# Patient Record
Sex: MALE | Race: WHITE | NOT HISPANIC OR LATINO | Employment: OTHER | URBAN - NONMETROPOLITAN AREA
[De-identification: names, ages, dates, MRNs, and addresses within clinical notes are randomized per-mention and may not be internally consistent; named-entity substitution may affect disease eponyms.]

---

## 2017-09-12 ENCOUNTER — OFFICE VISIT (OUTPATIENT)
Dept: GASTROENTEROLOGY | Facility: CLINIC | Age: 72
End: 2017-09-12

## 2017-09-12 VITALS
OXYGEN SATURATION: 98 % | BODY MASS INDEX: 27.44 KG/M2 | TEMPERATURE: 97.4 F | SYSTOLIC BLOOD PRESSURE: 134 MMHG | HEIGHT: 71 IN | HEART RATE: 67 BPM | DIASTOLIC BLOOD PRESSURE: 84 MMHG | WEIGHT: 196 LBS

## 2017-09-12 DIAGNOSIS — Z86.010 HX OF COLONIC POLYP: Primary | ICD-10-CM

## 2017-09-12 DIAGNOSIS — K22.70 BARRETT'S ESOPHAGUS WITHOUT DYSPLASIA: ICD-10-CM

## 2017-09-12 PROCEDURE — 99213 OFFICE O/P EST LOW 20 MIN: CPT | Performed by: NURSE PRACTITIONER

## 2017-09-12 RX ORDER — GEMFIBROZIL 600 MG/1
TABLET, FILM COATED ORAL
COMMUNITY
Start: 2011-01-04

## 2017-09-12 RX ORDER — LANOLIN ALCOHOL/MO/W.PET/CERES
CREAM (GRAM) TOPICAL
COMMUNITY
End: 2017-09-12 | Stop reason: ALTCHOICE

## 2017-09-12 RX ORDER — ESOMEPRAZOLE MAGNESIUM 40 MG/1
CAPSULE, DELAYED RELEASE ORAL
COMMUNITY
End: 2017-09-12 | Stop reason: SINTOL

## 2017-09-12 RX ORDER — LEVOTHYROXINE AND LIOTHYRONINE 19; 4.5 UG/1; UG/1
TABLET ORAL
COMMUNITY
End: 2017-09-12 | Stop reason: ALTCHOICE

## 2017-09-12 RX ORDER — OMEPRAZOLE 20 MG/1
20 CAPSULE, DELAYED RELEASE ORAL DAILY
COMMUNITY

## 2017-09-12 RX ORDER — TAMSULOSIN HYDROCHLORIDE 0.4 MG/1
1 CAPSULE ORAL DAILY
COMMUNITY

## 2017-09-12 NOTE — PROGRESS NOTES
[facility] Gastroenterology    Primary Physician Ace Ocasio MD    9/12/2017    Hector Silva   1945      Chief Complaint   Patient presents with   • Colonoscopy   Edwards's esophagus    Subjective     HPI    Hector Silva is a 72 y.o. male who presents as a referral for preventative maintenance. He has no complaints of nausea or vomiting. No change in bowels. No wt loss. No BRBPR. No melena. No abdominal pain.   No family hx for colon cancer or colon polyps.  He has personal h/o colon polyps. No personal h/o colon cancer.  Last colonoscopy  Was 5/2012, rec recall 5 year. Has lost 13 # over the last one month, eating more vegetables, not as much pork or meat.     Edwards's esophagus noted on egd , 5/2014. Taking omeprazole otc daily with control of gerd.  Denies dysphagia or odynophagia.  No reflux symptoms.     Past Medical History:   Diagnosis Date   • Colon polyp    • GERD (gastroesophageal reflux disease)    • Mitral insufficiency    • Tricuspid insufficiency        Past Surgical History:   Procedure Laterality Date   • COLONOSCOPY  05/04/2012   • ENDOSCOPY  05/29/2014    Edwards's esophagus       Outpatient Prescriptions Marked as Taking for the 9/12/17 encounter (Office Visit) with BHAVESH Fischer   Medication Sig Dispense Refill   • Cholecalciferol (VITAMIN D3 PO) Take  by mouth Daily.     • Cyanocobalamin 1000 MCG/ML kit Inject  as directed 1 (One) Time Per Week.     • gemfibrozil (LOPID) 600 MG tablet Take  by mouth.     • Glucosamine Sulfate 750 MG tablet Take  by mouth.     • omeprazole (priLOSEC) 20 MG capsule Take 20 mg by mouth Daily.     • tamsulosin (FLOMAX) 0.4 MG capsule 24 hr capsule Take 1 capsule by mouth Daily.     • Thyroid (THYROIDTHROID) 48.75 MG PO tablet tablet Take 48.75 mg by mouth 2 (Two) Times a Day.         Allergies   Allergen Reactions   • Amoxicillin    • Biaxin [Clarithromycin]    • Codeine    • Keflex [Cephalexin]    • Niaspan [Niacin Er]    •  Penicillins    • Sulfa Antibiotics    • Vancomycin        Social History     Social History   • Marital status:      Spouse name: N/A   • Number of children: N/A   • Years of education: N/A     Occupational History   • Not on file.     Social History Main Topics   • Smoking status: Never Smoker   • Smokeless tobacco: Never Used   • Alcohol use Yes      Comment: rare   • Drug use: No   • Sexual activity: Defer     Other Topics Concern   • Not on file     Social History Narrative       Family History   Problem Relation Age of Onset   • Cancer Mother      lung   • Cancer Brother      Melanoma   • Colon cancer Neg Hx    • Colon polyps Neg Hx        Review of Systems   Constitutional: Negative for appetite change, chills, fatigue, fever and unexpected weight change.   HENT: Negative for sore throat and trouble swallowing.    Respiratory: Negative for cough, chest tightness, shortness of breath and wheezing.    Cardiovascular: Negative for chest pain and palpitations.   Gastrointestinal: Negative for abdominal distention, abdominal pain, anal bleeding, blood in stool, constipation, diarrhea, nausea, rectal pain and vomiting.        As mentioned in hpi   Genitourinary: Negative for difficulty urinating, dysuria and hematuria.   Musculoskeletal: Negative for arthralgias and back pain.   Skin: Negative for color change and rash.   Neurological: Negative for dizziness, syncope, light-headedness and headaches.   Psychiatric/Behavioral: Negative for confusion. The patient is not nervous/anxious.        Objective     Vitals:    09/12/17 1026   BP: 134/84   Pulse: 67   Temp: 97.4 °F (36.3 °C)   SpO2: 98%     Last 2 weights    09/12/17  1026   Weight: 196 lb (88.9 kg)     Body mass index is 27.73 kg/(m^2).    Physical Exam   Constitutional: He appears well-developed and well-nourished. No distress.   HENT:   Head: Normocephalic and atraumatic.   Eyes: EOM are normal. No scleral icterus.   Neck: Neck supple. No JVD present.    Cardiovascular: Normal rate, regular rhythm and normal heart sounds.    Pulmonary/Chest: Effort normal and breath sounds normal. No stridor.   Abdominal: Soft. Bowel sounds are normal. He exhibits no distension and no mass. There is no tenderness. There is no rebound and no guarding.   Musculoskeletal: He exhibits no edema.   Neurological: He is alert.   Skin: Skin is warm and dry. No rash noted.   Psychiatric: He has a normal mood and affect. His behavior is normal.   Vitals reviewed.      Imaging Results (most recent)     None          Assessment/Plan     Hector was seen today for colonoscopy.    Diagnoses and all orders for this visit:    Hx of colonic polyp  -     Case Request; Standing  -     Case Request    Edwards's esophagus without dysplasia  -     Case Request; Standing  -     Case Request    Other orders  -     Implement Anesthesia Orders Day of Procedure; Standing  -     Obtain Informed Consent; Standing  -     polyethylene glycol (GOLYTELY) 236 g solution; Take 4,000 mL by mouth 1 (One) Time for 1 dose. Take as directed per instruction sheet.    plan for colonoscopy.  Plan for egd as well. Edwards's esophagus was noted on upper endoscopy 2014.  We did discuss Edwards's esophagus and it slim chance of developing cancer.  He verbalizes understanding.  He verbalizes understanding of the importance of taking the PPI on a daily basis.  rec continue omeprazole daily. Office visit as needed.    ESOPHAGOGASTRODUODENOSCOPY WITH ANESTHESIA (N/A), COLONOSCOPY WITH ANESTHESIA (N/A)  All risks, benefits, alternatives, and indications of colonoscopy procedure have been discussed with the patient. Risks to include perforation of the colon requiring possible surgery or colostomy, risk of bleeding from biopsies or removal of colon tissue, possibility of missing a colon polyp or cancer, or adverse drug reaction.  Benefits to include the diagnosis and management of disease of the colon and rectum. Alternatives to include  barium enema, radiographic evaluation, lab testing or no intervention. Pt verbalizes understanding and agrees.     Risk, benefits, and alternatives of endoscopy were explained in full.  They understand that there is a risk of bleeding, perforation, and infection.  The risk of perforation goes up with esophageal dilation.  Other options to evaluate UGI complaints could involve barium swallow or UGI series, but these would be diagnostic tests only.  Patient was given time to ask questions.  I answered them to their satisfaction and they are agreeable to proceeding        BHAVESH Angel      EMR Dragon/transcription disclaimer:  Much of this encounter note is electronic transcription/translation of spoken language to printed text.  The electronic translation of spoken language may be erroneous, or at times, nonsensical words or phrases may be inadvertently transcribed.  Although I have reviewed the note for such errors, some may still exist.

## 2017-09-13 PROBLEM — K22.70 BARRETT'S ESOPHAGUS WITHOUT DYSPLASIA: Status: ACTIVE | Noted: 2017-09-13

## 2017-09-13 PROBLEM — Z86.010 HX OF COLONIC POLYP: Status: ACTIVE | Noted: 2017-09-13

## 2017-10-03 LAB
ESTRADIOL LEVEL: 6 PG/ML
MAGNESIUM: 1.8 MG/DL (ref 1.6–2.4)
PROSTATE SPECIFIC ANTIGEN: 2.97 NG/ML (ref 0–4)
T3 FREE: 2.9 PG/ML (ref 2–4.4)
T4 FREE: 0.8 NG/DL (ref 0.9–1.7)
TSH SERPL DL<=0.05 MIU/L-ACNC: 0.43 UIU/ML (ref 0.27–4.2)
VITAMIN D 25-HYDROXY: >60 NG/ML

## 2017-10-04 LAB
SEX HORMONE BINDING GLOBULIN: 69 NMOL/L (ref 11–80)
TESTOSTERONE FREE PERCENT: 1.1 % (ref 1.6–2.9)
TESTOSTERONE FREE, CALC: 12 PG/ML (ref 47–244)
TESTOSTERONE TOTAL-MALE: 109 NG/DL (ref 300–720)

## 2017-10-06 LAB — T3 REVERSE: 13.3 NG/DL (ref 9–27)

## 2017-10-25 ENCOUNTER — HOSPITAL ENCOUNTER (OUTPATIENT)
Facility: HOSPITAL | Age: 72
Setting detail: HOSPITAL OUTPATIENT SURGERY
Discharge: HOME | End: 2017-10-25
Attending: INTERNAL MEDICINE | Admitting: INTERNAL MEDICINE

## 2017-10-25 ENCOUNTER — ANESTHESIA EVENT (OUTPATIENT)
Dept: GASTROENTEROLOGY | Facility: HOSPITAL | Age: 72
End: 2017-10-25

## 2017-10-25 ENCOUNTER — ANESTHESIA (OUTPATIENT)
Dept: GASTROENTEROLOGY | Facility: HOSPITAL | Age: 72
End: 2017-10-25

## 2017-10-25 VITALS
TEMPERATURE: 97.5 F | DIASTOLIC BLOOD PRESSURE: 76 MMHG | WEIGHT: 196 LBS | RESPIRATION RATE: 16 BRPM | BODY MASS INDEX: 28.06 KG/M2 | HEART RATE: 77 BPM | OXYGEN SATURATION: 96 % | HEIGHT: 70 IN | SYSTOLIC BLOOD PRESSURE: 128 MMHG

## 2017-10-25 DIAGNOSIS — K22.70 BARRETT'S ESOPHAGUS WITHOUT DYSPLASIA: ICD-10-CM

## 2017-10-25 PROCEDURE — 43239 EGD BIOPSY SINGLE/MULTIPLE: CPT | Performed by: INTERNAL MEDICINE

## 2017-10-25 PROCEDURE — 88305 TISSUE EXAM BY PATHOLOGIST: CPT | Performed by: INTERNAL MEDICINE

## 2017-10-25 PROCEDURE — 25010000002 PROPOFOL 10 MG/ML EMULSION: Performed by: NURSE ANESTHETIST, CERTIFIED REGISTERED

## 2017-10-25 PROCEDURE — G0105 COLORECTAL SCRN; HI RISK IND: HCPCS | Performed by: INTERNAL MEDICINE

## 2017-10-25 RX ORDER — SODIUM CHLORIDE 9 MG/ML
500 INJECTION, SOLUTION INTRAVENOUS CONTINUOUS PRN
Status: DISCONTINUED | OUTPATIENT
Start: 2017-10-25 | End: 2017-10-25 | Stop reason: HOSPADM

## 2017-10-25 RX ORDER — LIDOCAINE HYDROCHLORIDE 20 MG/ML
INJECTION, SOLUTION INFILTRATION; PERINEURAL AS NEEDED
Status: DISCONTINUED | OUTPATIENT
Start: 2017-10-25 | End: 2017-10-25 | Stop reason: SURG

## 2017-10-25 RX ORDER — PROPOFOL 10 MG/ML
VIAL (ML) INTRAVENOUS AS NEEDED
Status: DISCONTINUED | OUTPATIENT
Start: 2017-10-25 | End: 2017-10-25 | Stop reason: SURG

## 2017-10-25 RX ORDER — ONDANSETRON 2 MG/ML
4 INJECTION INTRAMUSCULAR; INTRAVENOUS ONCE AS NEEDED
Status: DISCONTINUED | OUTPATIENT
Start: 2017-10-25 | End: 2017-10-25 | Stop reason: HOSPADM

## 2017-10-25 RX ORDER — SODIUM CHLORIDE 0.9 % (FLUSH) 0.9 %
3 SYRINGE (ML) INJECTION AS NEEDED
Status: DISCONTINUED | OUTPATIENT
Start: 2017-10-25 | End: 2017-10-25 | Stop reason: HOSPADM

## 2017-10-25 RX ADMIN — PROPOFOL 450 MG: 10 INJECTION, EMULSION INTRAVENOUS at 09:00

## 2017-10-25 RX ADMIN — LIDOCAINE HYDROCHLORIDE 50 MG: 20 INJECTION, SOLUTION INFILTRATION; PERINEURAL at 09:00

## 2017-10-25 RX ADMIN — SODIUM CHLORIDE 500 ML: 0.9 INJECTION, SOLUTION INTRAVENOUS at 07:32

## 2017-10-25 NOTE — ANESTHESIA POSTPROCEDURE EVALUATION
Patient: Hector Silva    Procedure Summary     Date Anesthesia Start Anesthesia Stop Room / Location    10/25/17 0854 0929  [facility] / [facility]       Procedure Diagnosis Surgeon Provider    ESOPHAGOGASTRODUODENOSCOPY WITH ANESTHESIA (N/A Esophagus); COLONOSCOPY WITH ANESTHESIA (N/A ) Edwards's esophagus without dysplasia; Hx of colonic polyp  (Edwards's esophagus without dysplasia [K22.70]; Hx of colonic polyp [Z86.010]) MD Charlie Cruz, MAURA          Anesthesia Type: general  Last vitals  BP   130/75 (10/25/17 0708)   Temp   97.5 °F (36.4 °C) (10/25/17 0708)   Pulse   80 (10/25/17 0708)   Resp   18 (10/25/17 0708)     SpO2   99 % (10/25/17 0708)     Post Anesthesia Care and Evaluation    Patient location during evaluation: PHASE II  Patient participation: complete - patient participated  Level of consciousness: awake  Pain management: adequate  Airway patency: patent  Anesthetic complications: No anesthetic complications  Respiratory status: acceptable  Hydration status: acceptable

## 2017-10-25 NOTE — PLAN OF CARE
Problem: GI Endoscopy (Adult)  Goal: Signs and Symptoms of Listed Potential Problems Will be Absent or Manageable (GI Endoscopy)  Outcome: Outcome(s) achieved Date Met:  10/25/17    10/25/17 0949   GI Endoscopy   Problems Assessed (GI Endoscopy) all   Problems Present (GI Endoscopy) none

## 2017-10-25 NOTE — H&P
"[facility] Gastroenterology  Pre Procedure History & Physical    Chief Complaint:   Edwards's    Subjective     HPI:   Edwards's  He feels well.  No complaints on GI review  Past Medical History:   Past Medical History:   Diagnosis Date   • Arthritis    • Colon polyp    • Disease of thyroid gland    • GERD (gastroesophageal reflux disease)    • Mitral insufficiency    • Tricuspid insufficiency        Past Surgical History:  [unfilled]    Family History:  Family History   Problem Relation Age of Onset   • Cancer Mother      lung   • Cancer Brother      Melanoma   • Colon cancer Neg Hx    • Colon polyps Neg Hx        Social History:   reports that he has never smoked. He has never used smokeless tobacco. He reports that he drinks alcohol. He reports that he does not use illicit drugs.    Medications:   Prior to Admission medications    Medication Sig Start Date End Date Taking? Authorizing Provider   Cholecalciferol (VITAMIN D3 PO) Take  by mouth Daily.   Yes Historical Provider, MD   gemfibrozil (LOPID) 600 MG tablet Take  by mouth. 1/4/11  Yes Historical Provider, MD   omeprazole (priLOSEC) 20 MG capsule Take 20 mg by mouth Daily.   Yes Historical Provider, MD   tamsulosin (FLOMAX) 0.4 MG capsule 24 hr capsule Take 1 capsule by mouth Daily.   Yes Historical Provider, MD   Thyroid (THYROIDTHROID) 48.75 MG PO tablet tablet Take 48.75 mg by mouth 2 (Two) Times a Day.   Yes Historical Provider, MD   Cyanocobalamin 1000 MCG/ML kit Inject  as directed 1 (One) Time Per Week.    Historical Provider, MD   Glucosamine Sulfate 750 MG tablet Take  by mouth.    Historical Provider, MD       Allergies:  Amoxicillin; Biaxin [clarithromycin]; Codeine; Keflex [cephalexin]; Niaspan [niacin er]; Penicillins; Sulfa antibiotics; and Vancomycin    Objective     Blood pressure 130/75, pulse 80, temperature 97.5 °F (36.4 °C), temperature source Temporal Artery , resp. rate 18, height 70\" (177.8 cm), weight 196 lb (88.9 kg), SpO2 99 " %.    Physical Exam   Constitutional: Pt is oriented to person, place, and in no distress.   HENT: Mouth/Throat: Oropharynx is clear.   Cardiovascular: Normal rate, regular rhythm.    Pulmonary/Chest: Effort normal. No respiratory distress. No  wheezes.   Abdominal: Soft. Non-distended.  Skin: Skin is warm and dry.   Psychiatric: Mood, memory, affect and judgment appear normal.     Assessment/Plan     Diagnosis:  Edwards's  History of colon polyps  Anticipated Surgical Procedure:  Endoscopy  Colonoscopy  The risks, benefits, and alternatives of this procedure have been discussed with the patient or the responsible party- the patient understands and agrees to proceed.      EMR Dragon/transcription disclaimer:  Much of this encounter note is electronic transcription/translation of spoken language to printed text.  The electronic translation of spoken language may be erroneous, or at times, nonsensical words or phrases may be inadvertently transcribed.  Although I have reviewed the note for such errors, some may still exist.

## 2017-10-25 NOTE — PLAN OF CARE
Problem: Patient Care Overview (Adult)  Goal: Plan of Care Review  Outcome: Outcome(s) achieved Date Met:  10/25/17    10/25/17 0948   Coping/Psychosocial Response Interventions   Plan Of Care Reviewed With patient;spouse   Patient Care Overview   Progress improving   Outcome Evaluation   Outcome Summary/Follow up Plan discharge criteria met

## 2017-10-25 NOTE — PLAN OF CARE
Problem: Patient Care Overview (Adult)  Goal: Plan of Care Review  Outcome: Ongoing (interventions implemented as appropriate)    10/25/17 0918   Coping/Psychosocial Response Interventions   Plan Of Care Reviewed With patient   Patient Care Overview   Progress progress toward functional goals as expected   Outcome Evaluation   Outcome Summary/Follow up Plan tolerated procedure well

## 2017-10-25 NOTE — ANESTHESIA PREPROCEDURE EVALUATION
Anesthesia Evaluation     Patient summary reviewed and Nursing notes reviewed   NPO Solid Status: > 6 hours  NPO Liquid Status: > 6 hours     Airway   Mallampati: II  TM distance: >3 FB  Neck ROM: full  no difficulty expected  Dental      Pulmonary - negative pulmonary ROS and normal exam   Cardiovascular   Exercise tolerance: good (4-7 METS)    Rhythm: regular  Rate: normal    (+) valvular problems/murmurs MVP and TI,       Neuro/Psych- negative ROS  GI/Hepatic/Renal/Endo    (+)  GERD well controlled,     Musculoskeletal (-) negative ROS    Abdominal  - normal exam   Substance History      OB/GYN          Other                                        Anesthesia Plan    ASA 2     general   total IV anesthesia  intravenous induction

## 2017-10-27 LAB
CYTO UR: NORMAL
LAB AP CASE REPORT: NORMAL
LAB AP CLINICAL INFORMATION: NORMAL
Lab: NORMAL
PATH REPORT.FINAL DX SPEC: NORMAL
PATH REPORT.GROSS SPEC: NORMAL

## 2018-06-13 ENCOUNTER — HOSPITAL ENCOUNTER (OUTPATIENT)
Dept: GENERAL RADIOLOGY | Facility: HOSPITAL | Age: 73
Discharge: HOME | End: 2018-06-13
Attending: FAMILY MEDICINE

## 2018-06-13 PROCEDURE — 71046 X-RAY EXAM CHEST 2 VIEWS: CPT

## 2020-11-19 ENCOUNTER — TELEPHONE (OUTPATIENT)
Dept: GASTROENTEROLOGY | Facility: CLINIC | Age: 75
End: 2020-11-19

## 2020-11-19 NOTE — TELEPHONE ENCOUNTER
SPOKE WITH PT'S SPOUSE ABOUT PT BEING DUE FOR A REPEAT ENDOSCOPY. SHE SAID SHE WOULD HAVE HIM CALL US BACK TO MAKE AN OV APPT.  LETTER SENT TO PCP.

## 2020-12-16 ENCOUNTER — OFFICE VISIT (OUTPATIENT)
Dept: GASTROENTEROLOGY | Facility: CLINIC | Age: 75
End: 2020-12-16

## 2020-12-16 VITALS
HEIGHT: 70 IN | HEART RATE: 82 BPM | SYSTOLIC BLOOD PRESSURE: 128 MMHG | BODY MASS INDEX: 28.2 KG/M2 | OXYGEN SATURATION: 99 % | DIASTOLIC BLOOD PRESSURE: 80 MMHG | WEIGHT: 197 LBS | TEMPERATURE: 97.3 F

## 2020-12-16 DIAGNOSIS — K22.70 BARRETT'S ESOPHAGUS WITHOUT DYSPLASIA: Primary | ICD-10-CM

## 2020-12-16 PROCEDURE — 99202 OFFICE O/P NEW SF 15 MIN: CPT | Performed by: NURSE PRACTITIONER

## 2020-12-16 RX ORDER — FINASTERIDE 5 MG/1
5 TABLET, FILM COATED ORAL DAILY
COMMUNITY

## 2020-12-16 NOTE — PROGRESS NOTES
[facility] GASTROENTEROLOGY - OFFICE NOTE    12/16/2020    Hector Silva Jr.   1945    Primary Physician: Ace Ocasio MD    Chief Complaint   Patient presents with   • Endoscopy   santana's esophagus.      HISTORY OF PRESENT ILLNESS:     Hector Silva Jr. is a 75 y.o. male presents with santana's esophagus diagnosed more than 5 years ago. He takes omeprazole daily otc. No dysphagia. No weight loss. No n/v.        Last egd 10/2017 esophageal biopsy without dysplasia.     Past Medical History:   Diagnosis Date   • Arthritis    • Colon polyp    • Disease of thyroid gland    • GERD (gastroesophageal reflux disease)    • Mitral insufficiency    • Tricuspid insufficiency        Past Surgical History:   Procedure Laterality Date   • BACK SURGERY      x 3   • COLONOSCOPY  05/04/2012   • COLONOSCOPY N/A 10/25/2017    Procedure: COLONOSCOPY WITH ANESTHESIA;  Surgeon: Rainer Recio MD;  Location: [facility] ENDOSCOPY;  Service:    • CYST REMOVAL     • ENDOSCOPY  05/29/2014    Santana's esophagus   • ENDOSCOPY N/A 10/25/2017    Procedure: ESOPHAGOGASTRODUODENOSCOPY WITH ANESTHESIA;  Surgeon: Rainer Recio MD;  Location: [facility] ENDOSCOPY;  Service:    • FOOT SURGERY     • WRIST SURGERY         Outpatient Medications Marked as Taking for the 12/16/20 encounter (Office Visit) with Gabriela Streeter APRN   Medication Sig Dispense Refill   • Cholecalciferol (VITAMIN D3 PO) Take  by mouth Daily.     • Cyanocobalamin 1000 MCG/ML kit Inject  as directed 1 (One) Time Per Week.     • finasteride (PROSCAR) 5 MG tablet Take 5 mg by mouth Daily.     • gemfibrozil (LOPID) 600 MG tablet Take  by mouth.     • Glucosamine Sulfate 750 MG tablet Take  by mouth.     • omeprazole (priLOSEC) 20 MG capsule Take 20 mg by mouth Daily.     • tamsulosin (FLOMAX) 0.4 MG capsule 24 hr capsule Take 1 capsule by mouth Daily.     • Thyroid (THYROIDTHROID) 48.75 MG PO tablet tablet Take 48.75 mg by mouth 2 (Two) Times a Day.    "      Allergies   Allergen Reactions   • Amoxicillin Anaphylaxis   • Keflex [Cephalexin] Anaphylaxis   • Penicillins Anaphylaxis   • Sulfa Antibiotics Anaphylaxis   • Biaxin [Clarithromycin] Other (See Comments)     Pt does not recall   • Niaspan [Niacin Er] Other (See Comments)     Flushing \"sets me on fire\"   • Vancomycin Other (See Comments)     Flushing \"sets me on fire\"   • Codeine Rash       Social History     Socioeconomic History   • Marital status:      Spouse name: Not on file   • Number of children: Not on file   • Years of education: Not on file   • Highest education level: Not on file   Tobacco Use   • Smoking status: Never Smoker   • Smokeless tobacco: Never Used   Substance and Sexual Activity   • Alcohol use: Yes     Comment: rare   • Drug use: No   • Sexual activity: Defer       Family History   Problem Relation Age of Onset   • Cancer Mother         lung   • Cancer Brother         Melanoma   • Colon cancer Neg Hx    • Colon polyps Neg Hx        Review of Systems   Constitutional: Negative for appetite change, chills, fatigue, fever and unexpected weight change.   HENT: Negative for sore throat and trouble swallowing.    Eyes: Negative for visual disturbance.   Respiratory: Negative for cough, shortness of breath and wheezing.    Cardiovascular: Negative for chest pain and palpitations.   Gastrointestinal: Negative for abdominal distention, abdominal pain, anal bleeding, blood in stool, constipation, diarrhea, nausea and vomiting.        As mentioned in hpi   Genitourinary: Negative for difficulty urinating and hematuria.   Musculoskeletal: Negative for arthralgias and back pain.   Skin: Negative for color change and rash.   Neurological: Negative for dizziness, seizures, syncope, light-headedness and headaches.   Hematological: Negative for adenopathy.   Psychiatric/Behavioral: Negative for confusion. The patient is not nervous/anxious.         Vitals:    12/16/20 1022   BP: 128/80   Pulse: " "82   Temp: 97.3 °F (36.3 °C)   SpO2: 99%   Weight: 89.4 kg (197 lb)   Height: 177.8 cm (70\")      Body mass index is 28.27 kg/m².    Physical Exam  Vitals signs reviewed.   Constitutional:       General: He is not in acute distress.     Appearance: He is well-developed.   HENT:      Head: Normocephalic and atraumatic.   Eyes:      General: No scleral icterus.  Neck:      Musculoskeletal: Neck supple.      Vascular: No JVD.   Cardiovascular:      Rate and Rhythm: Normal rate and regular rhythm.      Heart sounds: Normal heart sounds.   Pulmonary:      Effort: Pulmonary effort is normal.      Breath sounds: Normal breath sounds.   Abdominal:      General: Bowel sounds are normal. There is no distension.      Palpations: Abdomen is soft.      Tenderness: There is no abdominal tenderness.   Musculoskeletal: Normal range of motion.      Right lower leg: No edema.      Left lower leg: No edema.   Skin:     General: Skin is warm and dry.   Neurological:      Mental Status: He is alert.      Comments: Oriented    Psychiatric:         Behavior: Behavior normal.         Results for orders placed or performed during the hospital encounter of 10/25/17   Tissue Pathology Exam - Tissue, GE Junction    Specimen: GE Junction; Tissue   Result Value Ref Range    Case Report       Surgical Pathology Report                         Case: [id number]                                  Authorizing Provider:  Rainer Recio MD       Collected:           10/25/2017 09:09 AM          [facility]     Received:            10/25/2017 10:39 AM                                 ENDOSCOPY                                                                    Pathologist:           Cindy Ortiz MD                                                          Specimen:    GE Junction, ge junction bx                                                                Clinical Information       Ace yolanda  Pre-hx " "barretts  Post-same      Final Diagnosis       Gastroesophageal junction, biopsy:  Gastroesophageal junctional mucosa with intestinal metaplasia and moderate chronic active esophagogastritis.  Negative for dysplasia.      Gross Description       Specimen #1 is received in a formalin filled container, labeled with the patient's name, date of birth, and \"GE junction biopsy\".  The specimen consists of 3 yellow-pink soft tissue fragments aggregating to 0.4 x 0.3 x 0.2 cm, totally in block 1A.      Microscopic Description       Histologic sections demonstrate gastroesophageal junctional-type mucosa.  Intestinal metaplasia is present.  There is a moderate mixed acute and chronic inflammatory infiltrate.  There is no evidence of dysplasia.      Embedded Images             ASSESSMENT AND PLAN    Assessment/Plan     Diagnoses and all orders for this visit:    1. Santana's esophagus without dysplasia (Primary)  -     Case Request; Standing  -     Case Request    Other orders  -     Follow Anesthesia Guidelines / Protocol; Future  -     Implement Anesthesia Orders Day of Procedure; Standing  -     Obtain Informed Consent; Standing  -     Obtain Informed Consent; Future        In regards to santana's he understands this could have a slim chance to develop cancer down the road. I recommend repeat egd and he is agreeable. Recommend continue omeprazole daily. He gets omeprazole otc at [facility]. Ov her prn.       ESOPHAGOGASTRODUODENOSCOPY WITH ANESTHESIA (N/A)  Risk, benefits, and alternatives of endoscopy were explained in full.  They understand that there is a risk of bleeding, perforation, and infection.  The risk of perforation goes up with esophageal dilation.  Other options to evaluate UGI complaints could involve barium swallow or UGI series, but these would be diagnostic tests only.  Patient was given time to ask questions.  I answered them to their satisfaction and they are agreeable to proceeding       Body mass index is " 28.27 kg/m².    Patient's Body mass index is 28.27 kg/m². BMI is within normal parameters. No follow-up required..               BHAVESH Angel/transcription disclaimer:  Much of this encounter note is electronic transcription/translation of spoken language to printed text.  The electronic translation of spoken language may be erroneous, or at times, nonsensical words or phrases may be inadvertently transcribed.  Although I have reviewed the note for such errors, some may still exist.

## 2020-12-30 ENCOUNTER — TRANSCRIBE ORDERS (OUTPATIENT)
Dept: LAB | Facility: HOSPITAL | Age: 75
End: 2020-12-30

## 2020-12-30 DIAGNOSIS — Z01.818 PRE-OP TESTING: Primary | ICD-10-CM

## 2021-01-05 ENCOUNTER — LAB (OUTPATIENT)
Dept: LAB | Facility: HOSPITAL | Age: 76
End: 2021-01-05

## 2021-01-05 LAB — SARS-COV-2 ORF1AB RESP QL NAA+PROBE: NOT DETECTED

## 2021-01-05 PROCEDURE — C9803 HOPD COVID-19 SPEC COLLECT: HCPCS | Performed by: INTERNAL MEDICINE

## 2021-01-05 PROCEDURE — U0004 COV-19 TEST NON-CDC HGH THRU: HCPCS | Performed by: INTERNAL MEDICINE

## 2021-01-08 ENCOUNTER — HOSPITAL ENCOUNTER (OUTPATIENT)
Facility: HOSPITAL | Age: 76
Setting detail: HOSPITAL OUTPATIENT SURGERY
Discharge: HOME | End: 2021-01-08
Attending: INTERNAL MEDICINE | Admitting: INTERNAL MEDICINE

## 2021-01-08 ENCOUNTER — ANESTHESIA (OUTPATIENT)
Dept: GASTROENTEROLOGY | Facility: HOSPITAL | Age: 76
End: 2021-01-08

## 2021-01-08 ENCOUNTER — ANESTHESIA EVENT (OUTPATIENT)
Dept: GASTROENTEROLOGY | Facility: HOSPITAL | Age: 76
End: 2021-01-08

## 2021-01-08 VITALS
WEIGHT: 197 LBS | TEMPERATURE: 97.2 F | SYSTOLIC BLOOD PRESSURE: 122 MMHG | BODY MASS INDEX: 28.2 KG/M2 | OXYGEN SATURATION: 99 % | HEIGHT: 70 IN | HEART RATE: 71 BPM | RESPIRATION RATE: 25 BRPM | DIASTOLIC BLOOD PRESSURE: 78 MMHG

## 2021-01-08 DIAGNOSIS — K22.70 BARRETT'S ESOPHAGUS WITHOUT DYSPLASIA: ICD-10-CM

## 2021-01-08 PROCEDURE — 88305 TISSUE EXAM BY PATHOLOGIST: CPT | Performed by: INTERNAL MEDICINE

## 2021-01-08 PROCEDURE — 43239 EGD BIOPSY SINGLE/MULTIPLE: CPT | Performed by: INTERNAL MEDICINE

## 2021-01-08 PROCEDURE — 25010000002 PROPOFOL 10 MG/ML EMULSION: Performed by: NURSE ANESTHETIST, CERTIFIED REGISTERED

## 2021-01-08 RX ORDER — SODIUM CHLORIDE 9 MG/ML
500 INJECTION, SOLUTION INTRAVENOUS CONTINUOUS PRN
Status: DISCONTINUED | OUTPATIENT
Start: 2021-01-08 | End: 2021-01-08 | Stop reason: HOSPADM

## 2021-01-08 RX ORDER — PROPOFOL 10 MG/ML
VIAL (ML) INTRAVENOUS AS NEEDED
Status: DISCONTINUED | OUTPATIENT
Start: 2021-01-08 | End: 2021-01-08 | Stop reason: SURG

## 2021-01-08 RX ORDER — ONDANSETRON 2 MG/ML
4 INJECTION INTRAMUSCULAR; INTRAVENOUS ONCE AS NEEDED
Status: DISCONTINUED | OUTPATIENT
Start: 2021-01-08 | End: 2021-01-08 | Stop reason: HOSPADM

## 2021-01-08 RX ORDER — LIDOCAINE HYDROCHLORIDE 10 MG/ML
0.5 INJECTION, SOLUTION EPIDURAL; INFILTRATION; INTRACAUDAL; PERINEURAL ONCE AS NEEDED
Status: DISCONTINUED | OUTPATIENT
Start: 2021-01-08 | End: 2021-01-08 | Stop reason: HOSPADM

## 2021-01-08 RX ORDER — SODIUM CHLORIDE 0.9 % (FLUSH) 0.9 %
10 SYRINGE (ML) INJECTION AS NEEDED
Status: DISCONTINUED | OUTPATIENT
Start: 2021-01-08 | End: 2021-01-08 | Stop reason: HOSPADM

## 2021-01-08 RX ADMIN — LIDOCAINE HYDROCHLORIDE 100 MG: 20 INJECTION, SOLUTION INTRAVENOUS at 10:44

## 2021-01-08 RX ADMIN — PROPOFOL 100 MG: 10 INJECTION, EMULSION INTRAVENOUS at 10:45

## 2021-01-08 RX ADMIN — PROPOFOL 50 MG: 10 INJECTION, EMULSION INTRAVENOUS at 10:51

## 2021-01-08 RX ADMIN — SODIUM CHLORIDE 500 ML: 9 INJECTION, SOLUTION INTRAVENOUS at 09:40

## 2021-01-08 NOTE — ANESTHESIA POSTPROCEDURE EVALUATION
Patient: Hector Silva Jr.    Procedure Summary     Date: 01/08/21 Room / Location: [facility] ENDOSCOPY 2 /  [facility]    Anesthesia Start: 1042 Anesthesia Stop: 1053    Procedure: ESOPHAGOGASTRODUODENOSCOPY WITH ANESTHESIA (N/A ) Diagnosis:       Edwards's esophagus without dysplasia      (Edwards's esophagus without dysplasia [K22.70])    Surgeon: Rainer Recio MD Provider: Joel Garcia CRNA    Anesthesia Type: MAC ASA Status: 2          Anesthesia Type: MAC    Vitals  No vitals data found for the desired time range.          Post Anesthesia Care and Evaluation    Patient location during evaluation: PHASE II  Patient participation: complete - patient participated  Level of consciousness: awake and alert  Pain score: 0  Pain management: adequate  Airway patency: patent  Anesthetic complications: No anesthetic complications  PONV Status: none  Cardiovascular status: acceptable and stable  Respiratory status: acceptable  Hydration status: acceptable

## 2021-01-08 NOTE — ANESTHESIA PREPROCEDURE EVALUATION
Anesthesia Evaluation     Patient summary reviewed   no history of anesthetic complications:  NPO Solid Status: > 8 hours  NPO Liquid Status: > 8 hours           Airway   Mallampati: I  TM distance: >3 FB  Neck ROM: full  No difficulty expected  Dental      Pulmonary - negative pulmonary ROS and normal exam   Cardiovascular - normal exam  Exercise tolerance: excellent (>7 METS)    (+) valvular problems/murmurs,       Neuro/Psych- negative ROS  GI/Hepatic/Renal/Endo    (+)  GERD well controlled,  thyroid problem hypothyroidism    Musculoskeletal     Abdominal  - normal exam   Substance History - negative use     OB/GYN          Other   arthritis,                      Anesthesia Plan    ASA 2     MAC     intravenous induction

## 2021-01-13 LAB
CYTO UR: NORMAL
LAB AP CASE REPORT: NORMAL
LAB AP CLINICAL INFORMATION: NORMAL
PATH REPORT.FINAL DX SPEC: NORMAL
PATH REPORT.GROSS SPEC: NORMAL

## 2021-04-08 ENCOUNTER — TRANSCRIBE ORDERS (OUTPATIENT)
Dept: LAB | Facility: HOSPITAL | Age: 76
End: 2021-04-08

## 2021-04-08 DIAGNOSIS — Z20.822 ENCOUNTER FOR PREPROCEDURE SCREENING LABORATORY TESTING FOR COVID-19: Primary | ICD-10-CM

## 2021-04-08 DIAGNOSIS — Z01.812 ENCOUNTER FOR PREPROCEDURE SCREENING LABORATORY TESTING FOR COVID-19: Primary | ICD-10-CM

## 2021-05-26 ENCOUNTER — TELEPHONE (OUTPATIENT)
Dept: UROLOGY | Age: 76
End: 2021-05-26

## 2021-06-30 ENCOUNTER — OFFICE VISIT (OUTPATIENT)
Dept: UROLOGY | Age: 76
End: 2021-06-30
Payer: MEDICARE

## 2021-06-30 VITALS — HEIGHT: 70 IN | TEMPERATURE: 98.3 F | BODY MASS INDEX: 27.92 KG/M2 | WEIGHT: 195 LBS

## 2021-06-30 DIAGNOSIS — N13.8 BPH WITH OBSTRUCTION/LOWER URINARY TRACT SYMPTOMS: Primary | ICD-10-CM

## 2021-06-30 DIAGNOSIS — N40.1 BPH WITH OBSTRUCTION/LOWER URINARY TRACT SYMPTOMS: Primary | ICD-10-CM

## 2021-06-30 LAB
APPEARANCE FLUID: CLEAR
BILIRUBIN, POC: NORMAL
BLOOD URINE, POC: NORMAL
CLARITY, POC: CLEAR
COLOR, POC: YELLOW
GLUCOSE URINE, POC: NORMAL
KETONES, POC: NORMAL
LEUKOCYTE EST, POC: NORMAL
NITRITE, POC: NORMAL
PH, POC: 6
PROTEIN, POC: NORMAL
SPECIFIC GRAVITY, POC: 1.01
UROBILINOGEN, POC: 0.2

## 2021-06-30 PROCEDURE — 81002 URINALYSIS NONAUTO W/O SCOPE: CPT | Performed by: NURSE PRACTITIONER

## 2021-06-30 PROCEDURE — 4040F PNEUMOC VAC/ADMIN/RCVD: CPT | Performed by: NURSE PRACTITIONER

## 2021-06-30 PROCEDURE — 99204 OFFICE O/P NEW MOD 45 MIN: CPT | Performed by: NURSE PRACTITIONER

## 2021-06-30 PROCEDURE — 1036F TOBACCO NON-USER: CPT | Performed by: NURSE PRACTITIONER

## 2021-06-30 PROCEDURE — 51798 US URINE CAPACITY MEASURE: CPT | Performed by: NURSE PRACTITIONER

## 2021-06-30 PROCEDURE — 1123F ACP DISCUSS/DSCN MKR DOCD: CPT | Performed by: NURSE PRACTITIONER

## 2021-06-30 PROCEDURE — G8417 CALC BMI ABV UP PARAM F/U: HCPCS | Performed by: NURSE PRACTITIONER

## 2021-06-30 PROCEDURE — G8427 DOCREV CUR MEDS BY ELIG CLIN: HCPCS | Performed by: NURSE PRACTITIONER

## 2021-06-30 RX ORDER — OMEPRAZOLE 20 MG/1
20 CAPSULE, DELAYED RELEASE ORAL DAILY
COMMUNITY

## 2021-06-30 RX ORDER — TAMSULOSIN HYDROCHLORIDE 0.4 MG/1
1 CAPSULE ORAL DAILY
COMMUNITY

## 2021-06-30 RX ORDER — FINASTERIDE 5 MG/1
5 TABLET, FILM COATED ORAL DAILY
COMMUNITY

## 2021-06-30 RX ORDER — TESTOSTERONE CYPIONATE 200 MG/ML
INJECTION INTRAMUSCULAR
COMMUNITY

## 2021-06-30 RX ORDER — ANASTROZOLE 1 MG/1
1 TABLET ORAL DAILY
COMMUNITY

## 2021-06-30 RX ORDER — ASCORBIC ACID 1000 MG
TABLET ORAL
COMMUNITY

## 2021-06-30 NOTE — PROGRESS NOTES
Nicki Merlin is a 64 y.o., male, Established patient who presents today   Chief Complaint   Patient presents with    New Patient     I was referred here today for bph. Benign Prostatic Hypertrophy  This is a chronic problem. The current episode started more than 1 year ago (5 years ago). The problem has been gradually worsening since onset. Irritative symptoms include frequency, nocturia and urgency. Obstructive symptoms include dribbling, incomplete emptying, an intermittent stream, a slower stream, straining and a weak stream. Associated symptoms include hesitancy. Pertinent negatives include no chills, dysuria, hematuria, nausea or vomiting. (Also on TRT) AUA score is 20-35 (25/35, bother score of 3). Past treatments include finasteride and tamsulosin (Flomax for 5 years. Finasteride for 4 years but reports only taking 0.5 tab 4 days a week). The treatment provided mild relief. He has been using treatment for 2 or more years. PSA  5/17/21  4.05 (corrected to 8.10)      REVIEW OF SYSTEMS:  Review of Systems   Constitutional: Negative for chills and fever. Gastrointestinal: Negative for abdominal distention, abdominal pain, nausea and vomiting. Genitourinary: Positive for difficulty urinating, frequency, hesitancy, incomplete emptying, nocturia and urgency. Negative for dysuria, flank pain and hematuria. Nocturia   Musculoskeletal: Negative for back pain and gait problem. Psychiatric/Behavioral: Negative for agitation and confusion. PHYSICAL EXAM:  Temp 98.3 °F (36.8 °C) (Temporal)   Ht 5' 10\" (1.778 m)   Wt 195 lb (88.5 kg)   BMI 27.98 kg/m²   Physical Exam  Vitals and nursing note reviewed. Constitutional:       General: He is not in acute distress. Appearance: Normal appearance. He is not ill-appearing. Pulmonary:      Effort: Pulmonary effort is normal. No respiratory distress. Abdominal:      General: There is no distension. Tenderness:  There is no abdominal tenderness. There is no right CVA tenderness or left CVA tenderness. Genitourinary:     Prostate: Not enlarged (20-25g), not tender and no nodules present. Neurological:      Mental Status: He is alert and oriented to person, place, and time. Mental status is at baseline. Psychiatric:         Mood and Affect: Mood normal.         Behavior: Behavior normal.         DATA:  Results for orders placed or performed in visit on 06/30/21   POCT Urinalysis no Micro   Result Value Ref Range    Color, UA YELLOW     Clarity, UA CLEAR     Glucose, UA POC NEG     Bilirubin, UA NEG     Ketones, UA NEG     Spec Grav, UA 1.010     Blood, UA POC NEG     pH, UA 6.0     Protein, UA POC NEG     Urobilinogen, UA 0.2     Leukocytes, UA NEG     Nitrite, UA NEG     Appearance, Fluid Clear Clear, Slightly Cloudy       ASSESSMENT/PLAN  1. BPH with obstruction/lower urinary tract symptoms  Patient presents with complaints lower urinary tract symptoms. He is currently maintained on Flomax and finasteride. His prostate exam did not reveal a significantly enlarged prostate, however, he is moderately to severely symptomatic based on his AUA symptom score. He has been on medications for several years without significant improvement. I explained to patient that he may be a candidate for Rezum versus TURP, however, given the size of his prostate I was unsure. I will have Dr. Rivas Melgar evaluate him with cystoscopy. - finasteride (PROSCAR) 5 MG tablet; Take 5 mg by mouth daily  - tamsulosin (FLOMAX) 0.4 MG capsule; Take 1 capsule by mouth daily  - NH Measure, post-void residual, US, non-imaging  - POCT Urinalysis no Micro  - Cystoscopy;  Future      Orders Placed This Encounter   Procedures    POCT Urinalysis no Micro    NH Measure, post-void residual, US, non-imaging    Cystoscopy     Next available     Standing Status:   Future     Standing Expiration Date:   6/30/2022     Scheduling Instructions:      Next available        Return for with Dr. Fernando Giordano, cystoscopy. An electronic signature was used to authenticate this note. DEBRA JORGENSEN, APRN - CNP    All information inputted into the note by the MA to include chief complaint, past medical history, past surgical history, medications, allergies, social and family history and review of systems has been reviewed and updated as needed by me. EMR Dragon/transcription disclaimer: Much of this document is electronic transcription/translation of spoken language to printed text. The electronic translation of spoken language may be erroneous or, at times, nonsensical words or phrases may be inadvertently transcribed.  Although I have reviewed the document for such errors, some may still exist.

## 2021-07-02 ASSESSMENT — ENCOUNTER SYMPTOMS
VOMITING: 0
NAUSEA: 0
ABDOMINAL DISTENTION: 0
BACK PAIN: 0
ABDOMINAL PAIN: 0

## 2021-07-28 ENCOUNTER — PROCEDURE VISIT (OUTPATIENT)
Dept: UROLOGY | Age: 76
End: 2021-07-28
Payer: MEDICARE

## 2021-07-28 DIAGNOSIS — N13.8 BPH WITH OBSTRUCTION/LOWER URINARY TRACT SYMPTOMS: Primary | ICD-10-CM

## 2021-07-28 DIAGNOSIS — N40.1 BPH WITH OBSTRUCTION/LOWER URINARY TRACT SYMPTOMS: Primary | ICD-10-CM

## 2021-07-28 DIAGNOSIS — R97.20 ELEVATED PSA: ICD-10-CM

## 2021-07-28 LAB
APPEARANCE FLUID: CLEAR
BILIRUBIN, POC: NORMAL
BLOOD URINE, POC: NORMAL
CLARITY, POC: CLEAR
COLOR, POC: YELLOW
GLUCOSE URINE, POC: NORMAL
KETONES, POC: NORMAL
LEUKOCYTE EST, POC: NORMAL
NITRITE, POC: NORMAL
PH, POC: 7.5
PROTEIN, POC: NORMAL
SPECIFIC GRAVITY, POC: 1.01
UROBILINOGEN, POC: 0.2

## 2021-07-28 PROCEDURE — 52000 CYSTOURETHROSCOPY: CPT | Performed by: UROLOGY

## 2021-07-28 PROCEDURE — 81002 URINALYSIS NONAUTO W/O SCOPE: CPT | Performed by: UROLOGY

## 2021-07-28 RX ORDER — CIPROFLOXACIN 500 MG/1
500 TABLET, FILM COATED ORAL 2 TIMES DAILY
Qty: 8 TABLET | Refills: 0 | Status: SHIPPED | OUTPATIENT
Start: 2021-07-28 | End: 2021-09-10 | Stop reason: ALTCHOICE

## 2021-07-28 NOTE — PROGRESS NOTES
Benign Prostatic Hypertrophy  This is a chronic problem. The current episode started more than 1 year ago (5 years ago). The problem has been gradually worsening since onset. Irritative symptoms include frequency, nocturia and urgency. Obstructive symptoms include dribbling, incomplete emptying, an intermittent stream, a slower stream, straining and a weak stream. Associated symptoms include hesitancy. Pertinent negatives include no chills, dysuria, hematuria, nausea or vomiting. (Also on TRT) AUA score is 20-35 (25/35, bother score of 3). Past treatments include finasteride and tamsulosin (Flomax for 5 years. Finasteride for 4 years but reports only taking 0.5 tab 4 days a week). The treatment provided mild relief. He has been using treatment for 2 or more. 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 He had a PSA done 5/17/2021 was 4.05. This corrects to 8. 10. He is not had a prostate biopsy. CIELO today reveals a prostate approximately 35 g smooth no nodularity. Cystoscopy Procedure Note    Indications: Diagnosis    Pre-operative Diagnosis: BPH with obstruction voiding    Post-operative Diagnosis: Same    Surgeon: Surya Anderson MD     Assistants: staff    Anesthesia: Local anesthesia topical 2% lidocaine gel    Procedure Details   The risks, benefits, complications, treatment options, and expected outcomes were discussed with the patient. The patient concurred with the proposed plan, giving informed consent. Cystoscopy was performed today under local anesthesia, using sterile technique. The patient was placed in the supine position, prepped with Hibiclens, and draped in the usual sterile fashion. A 17 Setswana sheath flexible cystoscope was used to inspect both the urethra and bladder using the flexible scope.     Findings:  Anterior urethra: normal without strictures therapy and has enlarged obstructive lateral lobes with protrusion of an anterior lobe therefore I think he would be best served by undergoing TURP however he needs transrectal ultrasound for volume determination and given the PSA he needs a biopsy prior to surgical intervention.  - POCT Urinalysis no Micro  - Cystoscopy    2. Elevated PSA  PSA corrects to 8 certainly above age-adjusted cut of 6.5 based on him taking finasteride. I recommend that we do a biopsy at the time of his volume measurement to make sure he does not have underlying prostate cancer prior to surgical intervention for outlet obstruction. If his biopsy is indeed negative he could proceed then with TURP  - ciprofloxacin (CIPRO) 500 MG tablet; Take 1 tablet by mouth 2 times daily Begin taking the day before the biopsy is scheduled. Dispense: 8 tablet; Refill: 0        Orders Placed This Encounter   Procedures    POCT Urinalysis no Micro        Return for PT to be scheduled for TRUS Biopsy of prostate.

## 2021-08-12 ENCOUNTER — OFFICE VISIT (OUTPATIENT)
Age: 76
End: 2021-08-12

## 2021-08-12 DIAGNOSIS — Z11.59 SCREENING FOR VIRAL DISEASE: Primary | ICD-10-CM

## 2021-08-12 LAB — SARS-COV-2, PCR: NOT DETECTED

## 2021-08-12 PROCEDURE — 99999 PR OFFICE/OUTPT VISIT,PROCEDURE ONLY: CPT | Performed by: NURSE PRACTITIONER

## 2021-08-16 ENCOUNTER — PROCEDURE VISIT (OUTPATIENT)
Dept: UROLOGY | Age: 76
End: 2021-08-16
Payer: MEDICARE

## 2021-08-16 VITALS — WEIGHT: 191 LBS | BODY MASS INDEX: 27.35 KG/M2 | HEIGHT: 70 IN

## 2021-08-16 DIAGNOSIS — R97.20 ELEVATED PSA: Primary | ICD-10-CM

## 2021-08-16 PROCEDURE — 96372 THER/PROPH/DIAG INJ SC/IM: CPT | Performed by: UROLOGY

## 2021-08-16 PROCEDURE — 55700 PR BIOPSY OF PROSTATE,NEEDLE/PUNCH: CPT | Performed by: UROLOGY

## 2021-08-16 PROCEDURE — 76872 US TRANSRECTAL: CPT | Performed by: UROLOGY

## 2021-08-16 RX ORDER — GENTAMICIN SULFATE 40 MG/ML
80 INJECTION, SOLUTION INTRAMUSCULAR; INTRAVENOUS ONCE
Status: COMPLETED | OUTPATIENT
Start: 2021-08-16 | End: 2021-08-16

## 2021-08-16 RX ADMIN — GENTAMICIN SULFATE 80 MG: 40 INJECTION, SOLUTION INTRAMUSCULAR; INTRAVENOUS at 08:11

## 2021-08-16 NOTE — PROGRESS NOTES
Mamadou Patton is a 64 y.o. male who presents today   Chief Complaint   Patient presents with    Procedure     I am here today for my prostate biopsy. Elevated PSA:  Patient is here today for history of an elevated PSA. HPI  Patient with BPH on combination max medical therapy with tamsulosin and finasteride. He has severe symptoms he is evaluated for possible surgical intervention. Cystoscopy was performed PSA done 5/17/2021 was 4.05 which corrects to 8.10 he has not had a biopsy he is here today for measurement of prostate size and prostate biopsy    PROSTATE U/S AND BIOPSY  As per my instructions, the patient took an antibiotic Beginning 1 day prior to this procedure. To be continued daily until 2 days post biopsy. Gentamicin 80mg IM was given today. He also had a Fleets enema. Surgeon: Samantha Martin MD  8/16/21  Indications for exam: Elevated PSA  Anesthesia: 1% Lidocaine periprostatic block bilaterally at junction of base of prostate and seminal vesicle, and apex   Ultrasound Findings:  Seminal Vesicles: intact bilaterally  Prostate Measurement: 49.5 grams;  PSAD 0.16  Transitional Zone: Normal echogenic structure  Peripheral Zone: Normal echogenic structure  Enlarged transition zone no specific lesion seen median lobe protruding in the bladder  Bladder: Minimal postvoid residual  Biopsy: Trans Rectal Ultra Sound was used for Needle Guidance to direct the location of the needle for biopsy. Biopsy cores were taken from the left and right  lobe in a sequential fashion using the standard 12 core template (lateral base; base; lateral mid; mid; lateral apex; apex). Six Biopsy were taken from the right and 6  were taken from the left. All specimens were sent for pathological examination. Biopsy done transrectal w/ transrectal U/S done. Postop medications: Cipro 500 mg po bid for 2 more days. Recommendations: Will call patient with biopsy results and arrange for follow up.      Postop Prostate Biopsy Instructions:  Patient told to drink plenty of fluids and to take it easy the day of the prostate biopsy, and the next few days. He was encouraged to use sitz baths as needed for relief of rectal discomfort after the biopsy. He was told he may notice blood or a rust color in his semen for several months after the biopsy. He was told to avoid resuming blood thinners or aspirin until the rectal bleeding or visible blood in urine has stopped for at least 48 hours. He should take his antibiotics to completion as prescribed. He was instructed to call our office immediately or to go to the Emergency Room if he experiences a fever over 101, shaking chills or an inability to urinate. Lab Results   Component Value Date    PSA 2.97 10/03/2017    PSA 2.66 01/05/2015    PSA 3.41 06/06/2014     PSA done 5/17/2021 was 4.05 this corrects to 8.10 on finasteride      1. Elevated PSA  We will contact patient with results should this be negative we can talk about surgical intervention for his obstructive voiding symptoms  - Surgical Pathology; Future  - SC Biopsy of prostate, needle/punch  - SC ECHO,TRANSRECTAL      Orders Placed This Encounter   Procedures    Surgical Pathology     Standing Status:   Future     Standing Expiration Date:   8/13/2022     Order Specific Question:   PREVIOUS BIOPSY     Answer:   No     Order Specific Question:   PREOP DIAGNOSIS     Answer:   R97.2     Order Specific Question:   FROZEN SECTION - NO OR YES/SPECIMEN     Answer:   No    SC ECHO,TRANSRECTAL    SC Biopsy of prostate, needle/punch        Return for [facility] call patient with results.

## 2021-08-19 ENCOUNTER — TELEPHONE (OUTPATIENT)
Dept: UROLOGY | Age: 76
End: 2021-08-19

## 2021-09-10 ENCOUNTER — INITIAL CONSULT (OUTPATIENT)
Dept: UROLOGY | Age: 76
End: 2021-09-10
Payer: MEDICARE

## 2021-09-10 DIAGNOSIS — N13.8 BPH WITH OBSTRUCTION/LOWER URINARY TRACT SYMPTOMS: ICD-10-CM

## 2021-09-10 DIAGNOSIS — C61 PROSTATE CANCER (HCC): Primary | ICD-10-CM

## 2021-09-10 DIAGNOSIS — E29.1 HYPOGONADISM IN MALE: ICD-10-CM

## 2021-09-10 DIAGNOSIS — N40.1 BPH WITH OBSTRUCTION/LOWER URINARY TRACT SYMPTOMS: ICD-10-CM

## 2021-09-10 PROCEDURE — G8417 CALC BMI ABV UP PARAM F/U: HCPCS | Performed by: UROLOGY

## 2021-09-10 PROCEDURE — 1123F ACP DISCUSS/DSCN MKR DOCD: CPT | Performed by: UROLOGY

## 2021-09-10 PROCEDURE — 99215 OFFICE O/P EST HI 40 MIN: CPT | Performed by: UROLOGY

## 2021-09-10 PROCEDURE — G8427 DOCREV CUR MEDS BY ELIG CLIN: HCPCS | Performed by: UROLOGY

## 2021-09-10 PROCEDURE — 4040F PNEUMOC VAC/ADMIN/RCVD: CPT | Performed by: UROLOGY

## 2021-09-10 PROCEDURE — 1036F TOBACCO NON-USER: CPT | Performed by: UROLOGY

## 2021-09-10 ASSESSMENT — ENCOUNTER SYMPTOMS
WHEEZING: 0
SORE THROAT: 0
NAUSEA: 0
EYE PAIN: 0
COUGH: 0
BACK PAIN: 0
VOMITING: 0

## 2021-09-10 NOTE — PROGRESS NOTES
Sharifa Blackwood is a 64 y.o. male who presents today   Chief Complaint   Patient presents with    Consultation     I am here for my cancer consult           Prostate Cancer:  Patient is here today for prostate cancer which was first diagnosed on 8/16/2021. His last several PSA values are as follows:  PSA done 5/17/2021 was 4.05 outside lab. This corrects to 8.10 on finasteride this prompted biopsy. Patient's previous CIELO revealed smooth prostate no nodularity proximate 35 g patient has significant lower urinary tract symptoms and underwent cystoscopy on 7/28/2021 for consideration of surgical intervention. At that time cystoscopy showed 3+ large bilobar prostatic hyperplasia with an anterior lobe on the left side protruding within the bladder and a medium size median lobe. I felt his CIELO underestimated his prostate volume. In anticipation of possible surgical intervention for BPH and obstructive voiding symptoms we proceeded with transrectal ultrasound biopsy which was performed on 8/16/2021 that showed prostate cancer. He comes in now to discuss management options regarding his clinically localized prostate cancer and his severe lower urinary tract symptoms. He also has been on testosterone replacement for the last several years. Lab Results   Component Value Date    PSA 2.97 10/03/2017    PSA 2.66 01/05/2015    PSA 3.41 06/06/2014     His prostate volume was 49.5 grams. PSAD 0.16  He had a prostate biopsy consisting of a total of 12 cores with 1 positive cores. TRUS Findings consisted of enlarged transition zone with median lobe protruding into the bladder no hypoechoic lesion seen  He has left sided disease with a Ap score of 3+3 = 6, grade group 1. Location of the the Cancer: Left lateral base occupying approximate 15% of the evaluated core  His clinical TNM stage was: [id number]  His pathological TNM stage was: T2a [facility]  The patient has not had a staging CT and bone scan.   Previous treatment of prostate cancer: none  Patient has Normal erectile function no, he states he has not had erections or been sexually active over the last year but prior to this he had a partial erections. He has been on TRT for the last 7 or 8 years mostly to address his symptoms of fatigue, lack of energy and overall feeling of wellbeing. He does understand that he will have to stop TRT. He has significant lower urinary tract symptoms and bladder outlet obstruction symptoms. He says he has been known since in his 45s he had prostate enlargement but his symptoms have progressively gotten worse primarily over the last couple years. He has been on tamsulosin for the last 5 years and finasteride for the last 4 years. He states this currently has provided no significant relief. He rates his symptoms as severe his AUA symptom score is 25. With a bother score of 3. He significantly reports this as a quality of life issue. And initially sought care to escalate treatment from medical to surgical intervention. Prostate Cancer Treatment Options  I have discussed in great detail with the patient the natural history, diagnosis and treatment of prostate cancer. I explained the Laura grading system, Staging system, correlation of disease with PSA levels, and  as well as the various treatments available for prostate cancer. I discussed the following options:  1. Watchful waiting / Active surveillance. I told that this approach was appropriate for older patients, patients with a life expectancy of 10 years or less and patients with low grade, low volume disease. This approach will require serial CIELO's, PSA's and possibly repeat prostate biopsy to check for grade or stage progression. 2.  Hormonal Therapy. I discussed the role of hormonal therapy in the form of LHRH agonists or antagonists such as Lupron, etc. Or surgical castration in the form of bilateral orchiectomy.   The patient was told that this is primarily used this is done after failure of radiation therapy and would require referral to an outside facility who does Cryrotherapy. Past Medical History:   Diagnosis Date    GERD (gastroesophageal reflux disease)     Hyperlipidemia     Median arcuate ligament syndrome (HCC)        Past Surgical History:   Procedure Laterality Date    FOOT SURGERY      left foot procedure    HAND SURGERY      bilateral hand procedure    HAND SURGERY      bilateral thumb procedure    OTHER SURGICAL HISTORY      right ear procedure    SPINE SURGERY      lumbar procedure x 3 for excision of benign tumor       Current Outpatient Medications   Medication Sig Dispense Refill    finasteride (PROSCAR) 5 MG tablet Take 5 mg by mouth daily      tamsulosin (FLOMAX) 0.4 MG capsule Take 1 capsule by mouth daily      omeprazole (PRILOSEC) 20 MG delayed release capsule Take 20 mg by mouth daily      testosterone cypionate (DEPOTESTOTERONE CYPIONATE) 200 MG/ML injection as directed      Coenzyme Q10 (CO Q 10) 10 MG CAPS Take by mouth      anastrozole (ARIMIDEX) 1 MG tablet Take 1 mg by mouth daily      thyroid (ARMOUR THYROID) 30 MG tablet Take 30 mg by mouth daily.  Pyridoxine HCl (VITAMIN B-6) 50 MG tablet Take 50 mg by mouth daily.  Glucosamine Sulfate 750 MG TABS Take 1 tablet by mouth daily.  gemfibrozil (LOPID) 600 MG tablet Take 300 mg by mouth daily. No current facility-administered medications for this visit.        Allergies   Allergen Reactions    Amoxicillin     Biaxin [Clarithromycin]     Cephalexin     Codeine     Niaspan [Niacin]     Penicillins     Sulfa Antibiotics     Vancomycin        Social History     Socioeconomic History    Marital status:      Spouse name: None    Number of children: None    Years of education: None    Highest education level: None   Occupational History    None   Tobacco Use    Smoking status: Never Smoker    Smokeless tobacco: Never Used   Substance and Sexual Activity    Alcohol use: No    Drug use: None    Sexual activity: None   Other Topics Concern    None   Social History Narrative    None     Social Determinants of Health     Financial Resource Strain:     Difficulty of Paying Living Expenses:    Food Insecurity:     Worried About Running Out of Food in the Last Year:     [address] in the Last Year:    Transportation Needs:     Lack of Transportation (Medical):  Lack of Transportation (Non-Medical):    Physical Activity:     Days of Exercise per Week:     Minutes of Exercise per Session:    Stress:     Feeling of Stress :    Social Connections:     Frequency of Communication with Friends and Family:     Frequency of Social Gatherings with Friends and Family:     Attends Muslim Services:     Active Member of Clubs or Organizations:     Attends Club or Organization Meetings:     Marital Status:    Intimate Partner Violence:     Fear of Current or Ex-Partner:     Emotionally Abused:     Physically Abused:     Sexually Abused:        Family History   Problem Relation Age of Onset    High Blood Pressure Mother     Cancer Mother     Heart Disease Father         CHF    Heart Disease Brother        REVIEW OF SYSTEMS:  Review of Systems   Constitutional: Negative for chills and fever. HENT: Negative for congestion and sore throat. Eyes: Negative for pain and visual disturbance. Respiratory: Negative for cough and wheezing. Cardiovascular: Negative for chest pain and palpitations. Gastrointestinal: Negative for nausea and vomiting. Endocrine: Negative for polyphagia and polyuria. Genitourinary: Positive for difficulty urinating. Negative for decreased urine volume, discharge, dysuria, enuresis, flank pain, frequency, genital sores, hematuria, penile pain, penile swelling, scrotal swelling, testicular pain and urgency. Musculoskeletal: Negative for back pain and neck pain. Skin: Negative for rash and wound. Allergic/Immunologic: Negative for environmental allergies and food allergies. Neurological: Negative for dizziness and headaches. Hematological: Negative for adenopathy. Does not bruise/bleed easily. Psychiatric/Behavioral: Negative for confusion and hallucinations. All other systems reviewed and are negative. PHYSICAL EXAM:  There were no vitals taken for this visit. Physical Exam  Constitutional:       General: He is not in acute distress. Appearance: Normal appearance. He is well-developed. HENT:      Head: Normocephalic and atraumatic. Nose: Nose normal.   Eyes:      General: No scleral icterus. Conjunctiva/sclera: Conjunctivae normal.      Pupils: Pupils are equal, round, and reactive to light. Neck:      Trachea: No tracheal deviation. Cardiovascular:      Rate and Rhythm: Normal rate and regular rhythm. Pulmonary:      Effort: Pulmonary effort is normal. No respiratory distress. Breath sounds: No stridor. Abdominal:      General: There is no distension. Palpations: Abdomen is soft. There is no mass. Tenderness: There is no abdominal tenderness. Genitourinary:     Prostate: Enlarged (35 g). No nodules present. Musculoskeletal:         General: No tenderness. Normal range of motion. Cervical back: Normal range of motion and neck supple. Lymphadenopathy:      Cervical: No cervical adenopathy. Skin:     General: Skin is warm and dry. Findings: No erythema. Neurological:      Mental Status: He is alert and oriented to person, place, and time.    Psychiatric:         Behavior: Behavior normal.         Judgment: Judgment normal.             DATA:  CMP:    Lab Results   Component Value Date     06/26/2013    K 4.5 06/26/2013     06/26/2013    CO2 31 06/26/2013    BUN 16 06/26/2013    CREATININE 1.4 06/26/2013    LABGLOM 53 06/26/2013    GLUCOSE 115 06/26/2013    CALCIUM 9.7 06/26/2013     No results found for this visit on 09/10/21. Lab Results   Component Value Date    PSA 2.97 10/03/2017    PSA 2.66 2015    PSA 3.41 2014       PSA done 2021 was 4.05 outside lab. This corrects to 8.10 on finasteride this prompted biopsy. [address] [place] [address],[address], [place] [address]   Department of Pathology   FINAL SURGICAL PATHOLOGY REPORT   Patient Name: Tita Lopez              Accession No:  [id number]    Age Sex:   1945    76 Y M        Pt Type: R     KMURO                                              Location:   Account #     [de-identified]                 Collected:     2021   Med Rec #      [id number]                    Received:      2021   Attend Phys:                               Completed:     2021   Perform Phys: Yecenia Banks     FINAL DIAGNOSIS:     A.  Prostate, right lateral base needle biopsy: Benign seminal vesicle   and stromal tissue. B.  Prostate, right lateral mid needle biopsy: Benign prostatic stroma. C.  Prostate, right lateral apex needle biopsy: Benign prostatic   parenchyma. D.  Prostate, right base needle biopsy: Benign seminal vesicle and   stroma. B.  Prostate, right mid needle biopsy: Benign prostatic parenchyma. Sherle Courts, right apex needle biopsy: Benign prostatic parenchyma. G.  Prostate, left lateral base needle biopsy: Prostatic adenocarcinoma   ([place]'s grade 3+3 = 6), measuring 0.2 cm in greatest linear dimension   and occupying approximately 15% of the evaluated core, grade group 1. H.  Prostate, left lateral mid needle biopsy: Benign prostatic   parenchyma. I.  Prostate, left lateral apex needle biopsy benign prostatic   parenchyma. J.  Prostate, left base needle biopsy: Benign prostatic parenchyma. K.  Prostate, left mid needle biopsy: Benign prostatic parenchyma.      L.  Prostate, left apex needle biopsy: Benign prostatic parenchyma.    CBG/CBG           1. Prostate cancer [facility])   Long discussion with the patient and his wife consisting of greater than 45 minutes of face-to-face time as well as additional time for documentation review of pathology report and coordination of care/referral.    Patient has clinically localized low risk clinical stage T1c NX MX, [place] 6, grade group 1, prostate cancer involving 1 core less than 15% of the core. I told him given his age and the pathologic findings that he would be an ideal candidate for active surveillance. He is quite certain he does not want to do active surveillance. He specifically states that he would like to have the prostate removed and he would not consider radiation therapy either. He is a very healthy 70-year-old so his physiologic age is probably more like  early 76s he is active at home, he still works as an , and he has no significant comorbidities currently. I did discuss with him that he may want to have a conversation with his internal medicine PCP and go  over regarding overall risk assessment and maybe even a cardiac stress test if they feel this is helpful for restratification for possible surgery. He currently indicates no dyspnea on exertion no chest pain or lower extremity swelling or edema. He has been on TRT and I told him this would have to be discontinued. After long and thorough discussion with him he would like to proceed with surgery and desires a referral to Dr. Anel Cano for robot-assisted laparoscopic prostatectomy. He does have significant bladder outlet obstructive symptoms secondary to BPH he has been on combination medical therapy and certainly would benefit from a symptomatic standpoint to have his prostate removed versus other treatment such as AS or XRT. 2. BPH with obstruction/lower urinary tract symptoms  He will continue his current combination medical therapy with finasteride and tamsulosin.

## 2022-01-20 ENCOUNTER — TELEPHONE (OUTPATIENT)
Dept: UROLOGY | Age: 77
End: 2022-01-20

## 2022-01-20 NOTE — TELEPHONE ENCOUNTER
Pt called stating that he is having surgery at [facility] in feb. And wali is wanting him to have a blood test at their facility.  But pt is wanting to know if dr. Bailey Birmingham could order it for him so that he can have it done here and not have to make 2 trips down to [facility]

## 2022-02-15 ENCOUNTER — NURSE ONLY (OUTPATIENT)
Dept: UROLOGY | Age: 77
End: 2022-02-15
Payer: MEDICARE

## 2022-02-15 DIAGNOSIS — C61 PROSTATE CANCER (HCC): ICD-10-CM

## 2022-02-15 DIAGNOSIS — N13.8 BPH WITH OBSTRUCTION/LOWER URINARY TRACT SYMPTOMS: ICD-10-CM

## 2022-02-15 DIAGNOSIS — N40.1 BPH WITH OBSTRUCTION/LOWER URINARY TRACT SYMPTOMS: ICD-10-CM

## 2022-02-15 PROCEDURE — 51700 IRRIGATION OF BLADDER: CPT | Performed by: NURSE PRACTITIONER

## 2022-02-15 NOTE — PROGRESS NOTES
Patient of Dr Joann Casey presents today for voiding trial post surgery at [facility]. The patient denies any fever, chills or  N&V. After patient had given consent, using the catheter in place, 180cc of sterile water was installed into the bladder with no complications. Patient was able to void 150cc. Eladia LAM was in office at time of procedure. Patient was advised to drink clear fluids for the next couple hours and urinate. Patient was advised they may experience some blood in the urine and burning with urination for the next couple days. If the patient is unable to urinate or develops fever, chills, N&V or suprapubic pain, they will call office for an appt at clinic or seek medical treatment at nearest ER, PCP or Urgent Care if after hours. Patient verbalized understanding and all questions were answered. Patient advised to call the office with any questions or concerns. Patient is to follow up as scheduled at [facility].

## 2022-10-27 ENCOUNTER — HOSPITAL ENCOUNTER (OUTPATIENT)
Dept: GENERAL RADIOLOGY | Facility: HOSPITAL | Age: 77
Discharge: HOME | End: 2022-10-27

## 2022-10-27 ENCOUNTER — TRANSCRIBE ORDERS (OUTPATIENT)
Dept: ADMINISTRATIVE | Facility: HOSPITAL | Age: 77
End: 2022-10-27

## 2022-10-27 DIAGNOSIS — Z02.89 ENCOUNTER FOR PHYSICAL EXAMINATION RELATED TO EMPLOYMENT: Primary | ICD-10-CM

## 2022-10-27 PROCEDURE — 71046 X-RAY EXAM CHEST 2 VIEWS: CPT

## 2022-11-29 NOTE — PLAN OF CARE
Problem: GI Endoscopy (Adult)  Goal: Signs and Symptoms of Listed Potential Problems Will be Absent or Manageable (GI Endoscopy)  Outcome: Ongoing (interventions implemented as appropriate)    10/25/17 0659   GI Endoscopy   Problems Assessed (GI Endoscopy) all   Problems Present (GI Endoscopy) none

## 2024-02-08 ENCOUNTER — ANESTHESIA EVENT (OUTPATIENT)
Dept: OPERATING ROOM | Age: 79
End: 2024-02-08
Payer: MEDICARE

## 2024-02-08 ENCOUNTER — HOSPITAL ENCOUNTER (OUTPATIENT)
Age: 79
Setting detail: OUTPATIENT SURGERY
Discharge: HOME | End: 2024-02-08
Attending: ORTHOPAEDIC SURGERY | Admitting: ORTHOPAEDIC SURGERY
Payer: MEDICARE

## 2024-02-08 ENCOUNTER — ANESTHESIA (OUTPATIENT)
Dept: OPERATING ROOM | Age: 79
End: 2024-02-08
Payer: MEDICARE

## 2024-02-08 VITALS
HEART RATE: 84 BPM | RESPIRATION RATE: 16 BRPM | HEIGHT: 70 IN | TEMPERATURE: 97.6 F | OXYGEN SATURATION: 99 % | BODY MASS INDEX: 26.2 KG/M2 | DIASTOLIC BLOOD PRESSURE: 70 MMHG | WEIGHT: 183 LBS | SYSTOLIC BLOOD PRESSURE: 119 MMHG

## 2024-02-08 PROBLEM — M65.332 TRIGGER FINGER, LEFT MIDDLE FINGER: Status: ACTIVE | Noted: 2024-02-08

## 2024-02-08 PROCEDURE — 3700000001 HC ADD 15 MINUTES (ANESTHESIA): Performed by: ORTHOPAEDIC SURGERY

## 2024-02-08 PROCEDURE — 7100000010 HC PHASE II RECOVERY - FIRST 15 MIN: Performed by: ORTHOPAEDIC SURGERY

## 2024-02-08 PROCEDURE — 7100000011 HC PHASE II RECOVERY - ADDTL 15 MIN: Performed by: ORTHOPAEDIC SURGERY

## 2024-02-08 PROCEDURE — 2709999900 HC NON-CHARGEABLE SUPPLY: Performed by: ORTHOPAEDIC SURGERY

## 2024-02-08 PROCEDURE — 3700000000 HC ANESTHESIA ATTENDED CARE: Performed by: ORTHOPAEDIC SURGERY

## 2024-02-08 PROCEDURE — 3600000003 HC SURGERY LEVEL 3 BASE: Performed by: ORTHOPAEDIC SURGERY

## 2024-02-08 PROCEDURE — 7100000001 HC PACU RECOVERY - ADDTL 15 MIN: Performed by: ORTHOPAEDIC SURGERY

## 2024-02-08 PROCEDURE — 6360000002 HC RX W HCPCS: Performed by: NURSE ANESTHETIST, CERTIFIED REGISTERED

## 2024-02-08 PROCEDURE — 2500000003 HC RX 250 WO HCPCS: Performed by: NURSE ANESTHETIST, CERTIFIED REGISTERED

## 2024-02-08 PROCEDURE — 6370000000 HC RX 637 (ALT 250 FOR IP): Performed by: ORTHOPAEDIC SURGERY

## 2024-02-08 PROCEDURE — 2580000003 HC RX 258: Performed by: ORTHOPAEDIC SURGERY

## 2024-02-08 PROCEDURE — 3600000013 HC SURGERY LEVEL 3 ADDTL 15MIN: Performed by: ORTHOPAEDIC SURGERY

## 2024-02-08 PROCEDURE — 7100000000 HC PACU RECOVERY - FIRST 15 MIN: Performed by: ORTHOPAEDIC SURGERY

## 2024-02-08 PROCEDURE — 2500000003 HC RX 250 WO HCPCS: Performed by: ORTHOPAEDIC SURGERY

## 2024-02-08 RX ORDER — BISMUTH TRIBROMOPH/PETROLATUM 5"X9"
BANDAGE TOPICAL PRN
Status: DISCONTINUED | OUTPATIENT
Start: 2024-02-08 | End: 2024-02-08 | Stop reason: ALTCHOICE

## 2024-02-08 RX ORDER — PROPOFOL 10 MG/ML
INJECTION, EMULSION INTRAVENOUS PRN
Status: DISCONTINUED | OUTPATIENT
Start: 2024-02-08 | End: 2024-02-08 | Stop reason: SDUPTHER

## 2024-02-08 RX ORDER — HYDROMORPHONE HYDROCHLORIDE 1 MG/ML
0.25 INJECTION, SOLUTION INTRAMUSCULAR; INTRAVENOUS; SUBCUTANEOUS EVERY 5 MIN PRN
Status: DISCONTINUED | OUTPATIENT
Start: 2024-02-08 | End: 2024-02-08 | Stop reason: HOSPADM

## 2024-02-08 RX ORDER — SODIUM CHLORIDE 0.9 % (FLUSH) 0.9 %
5-40 SYRINGE (ML) INJECTION EVERY 12 HOURS SCHEDULED
Status: DISCONTINUED | OUTPATIENT
Start: 2024-02-08 | End: 2024-02-08 | Stop reason: HOSPADM

## 2024-02-08 RX ORDER — SODIUM CHLORIDE 9 MG/ML
INJECTION, SOLUTION INTRAVENOUS PRN
Status: DISCONTINUED | OUTPATIENT
Start: 2024-02-08 | End: 2024-02-08 | Stop reason: HOSPADM

## 2024-02-08 RX ORDER — MIDAZOLAM HYDROCHLORIDE 1 MG/ML
INJECTION INTRAMUSCULAR; INTRAVENOUS PRN
Status: DISCONTINUED | OUTPATIENT
Start: 2024-02-08 | End: 2024-02-08 | Stop reason: SDUPTHER

## 2024-02-08 RX ORDER — EPHEDRINE SULFATE 50 MG/ML
INJECTION, SOLUTION INTRAVENOUS PRN
Status: DISCONTINUED | OUTPATIENT
Start: 2024-02-08 | End: 2024-02-08 | Stop reason: SDUPTHER

## 2024-02-08 RX ORDER — ONDANSETRON 2 MG/ML
INJECTION INTRAMUSCULAR; INTRAVENOUS PRN
Status: DISCONTINUED | OUTPATIENT
Start: 2024-02-08 | End: 2024-02-08 | Stop reason: SDUPTHER

## 2024-02-08 RX ORDER — SODIUM CHLORIDE 0.9 % (FLUSH) 0.9 %
5-40 SYRINGE (ML) INJECTION PRN
Status: DISCONTINUED | OUTPATIENT
Start: 2024-02-08 | End: 2024-02-08 | Stop reason: HOSPADM

## 2024-02-08 RX ORDER — LIDOCAINE HYDROCHLORIDE AND EPINEPHRINE 10; 10 MG/ML; UG/ML
INJECTION, SOLUTION INFILTRATION; PERINEURAL PRN
Status: DISCONTINUED | OUTPATIENT
Start: 2024-02-08 | End: 2024-02-08 | Stop reason: ALTCHOICE

## 2024-02-08 RX ORDER — CLINDAMYCIN PHOSPHATE 900 MG/50ML
900 INJECTION, SOLUTION INTRAVENOUS ONCE
Status: DISCONTINUED | OUTPATIENT
Start: 2024-02-08 | End: 2024-02-08 | Stop reason: HOSPADM

## 2024-02-08 RX ORDER — HYDROMORPHONE HYDROCHLORIDE 1 MG/ML
0.5 INJECTION, SOLUTION INTRAMUSCULAR; INTRAVENOUS; SUBCUTANEOUS EVERY 5 MIN PRN
Status: DISCONTINUED | OUTPATIENT
Start: 2024-02-08 | End: 2024-02-08 | Stop reason: HOSPADM

## 2024-02-08 RX ORDER — CLINDAMYCIN PHOSPHATE 150 MG/ML
INJECTION, SOLUTION INTRAVENOUS PRN
Status: DISCONTINUED | OUTPATIENT
Start: 2024-02-08 | End: 2024-02-08 | Stop reason: SDUPTHER

## 2024-02-08 RX ORDER — DEXAMETHASONE SODIUM PHOSPHATE 4 MG/ML
4 INJECTION, SOLUTION INTRA-ARTICULAR; INTRALESIONAL; INTRAMUSCULAR; INTRAVENOUS; SOFT TISSUE ONCE
Status: DISCONTINUED | OUTPATIENT
Start: 2024-02-08 | End: 2024-02-08 | Stop reason: HOSPADM

## 2024-02-08 RX ORDER — FENTANYL CITRATE 50 UG/ML
INJECTION, SOLUTION INTRAMUSCULAR; INTRAVENOUS PRN
Status: DISCONTINUED | OUTPATIENT
Start: 2024-02-08 | End: 2024-02-08 | Stop reason: SDUPTHER

## 2024-02-08 RX ORDER — SODIUM CHLORIDE, SODIUM LACTATE, POTASSIUM CHLORIDE, CALCIUM CHLORIDE 600; 310; 30; 20 MG/100ML; MG/100ML; MG/100ML; MG/100ML
INJECTION, SOLUTION INTRAVENOUS CONTINUOUS
Status: DISCONTINUED | OUTPATIENT
Start: 2024-02-08 | End: 2024-02-08 | Stop reason: HOSPADM

## 2024-02-08 RX ORDER — ONDANSETRON 2 MG/ML
4 INJECTION INTRAMUSCULAR; INTRAVENOUS
Status: DISCONTINUED | OUTPATIENT
Start: 2024-02-08 | End: 2024-02-08 | Stop reason: HOSPADM

## 2024-02-08 RX ADMIN — EPHEDRINE SULFATE 10 MG: 50 INJECTION INTRAMUSCULAR; INTRAVENOUS; SUBCUTANEOUS at 07:15

## 2024-02-08 RX ADMIN — PROPOFOL 100 MCG/KG/MIN: 10 INJECTION, EMULSION INTRAVENOUS at 07:10

## 2024-02-08 RX ADMIN — CLINDAMYCIN PHOSPHATE 900 MG: 150 INJECTION, SOLUTION INTRAMUSCULAR; INTRAVENOUS at 07:09

## 2024-02-08 RX ADMIN — SODIUM CHLORIDE, POTASSIUM CHLORIDE, SODIUM LACTATE AND CALCIUM CHLORIDE: 600; 310; 30; 20 INJECTION, SOLUTION INTRAVENOUS at 06:08

## 2024-02-08 RX ADMIN — ONDANSETRON 4 MG: 2 INJECTION INTRAMUSCULAR; INTRAVENOUS at 07:17

## 2024-02-08 RX ADMIN — FENTANYL CITRATE 50 MCG: 0.05 INJECTION, SOLUTION INTRAMUSCULAR; INTRAVENOUS at 07:05

## 2024-02-08 RX ADMIN — PROPOFOL 50 MG: 10 INJECTION, EMULSION INTRAVENOUS at 07:05

## 2024-02-08 RX ADMIN — MIDAZOLAM 2 MG: 1 INJECTION INTRAMUSCULAR; INTRAVENOUS at 07:05

## 2024-02-08 ASSESSMENT — PAIN - FUNCTIONAL ASSESSMENT
PAIN_FUNCTIONAL_ASSESSMENT: 0-10
PAIN_FUNCTIONAL_ASSESSMENT: 0-10
PAIN_FUNCTIONAL_ASSESSMENT: FACE, LEGS, ACTIVITY, CRY, AND CONSOLABILITY (FLACC)

## 2024-02-08 ASSESSMENT — LIFESTYLE VARIABLES: SMOKING_STATUS: 0

## 2024-02-08 NOTE — DISCHARGE INSTRUCTIONS
UPPER EXTREMITY POST-OP INSTRUCTIONS - Dr. Joy    IMPORTANT PHONE NUMBERS:   For emergencies, please call [phone]   You may reach Dr. Joy and clinical staff at [phone]- M-F 8:00 am-5:00 pm   After 5pm or on the weekends, please call [phone]   Call immediately if you have any of the following symptoms:     Elevated temperature above 101.5 degrees for more than 48 hours after surgery     Persistent drainage from wound     Severe pain around surgical site    Sling use: The sling is provided for your comfort and to ensure proper healing of your repair following surgery. Please place the abduction pillow with the curved side against your side and the sling on the side of the pillow. Your surgery requires that you wear the sling if noted below.  __x__ No sling required    Bathing:  _x__You may remove you dressing and shower on the 3rd day after surgery   ** if you are told to it is ok to remove your dressing and shower, DO NOT SOAK your incisions in a tub.      Dressings: Keep dressing/splint intact unless instructed otherwise below. SOME DRAINAGE IS NORMAL!     DO NOT touch or apply ointment to the incision.     DO NOT remove the steri-strips over the incisions (if you have steri-strips). They will         generally fall off on their own or can be removed 1 week after surgery.     If you have yellow gauze and it comes off, do not worry about it. Leave them off.    Signs of infection that warrant a phone call to our clinical line:     o Excessive drainage or redness     o Red streaking coming away from the incision     o Increased pain     o Increased temperature above 101 degrees      Activity:  Elevate left upper extremity.  Encourage gentle finger range of motion.  No heavy lifting or high-impact activities with the operative hand until follow-up.    Medications: Pain medication as prescribed.  No additional Tylenol, alcohol or driving while on opioid pain medication.  Wean off pain medication as  able.      **If you are running low on pain medications, please notify us if you need a refill 24-48 hours prior to when you run out, so we can make arrangements to refill the prescription for you if we determine is necessary

## 2024-02-08 NOTE — H&P
Nicolas Alvarez Jr. is an 78 y.o. gentleman who presents today for planned left middle finger A1 pulley release after failing conservative management.  He reports no significant interval medical changes from his prior clinical visit.    Past Medical History:   Diagnosis Date    Cancer (HCC)     prostate    GERD (gastroesophageal reflux disease)     Hyperlipidemia     Median arcuate ligament syndrome (HCC)        Allergies:   Allergies   Allergen Reactions    Amoxicillin Anaphylaxis    Biaxin [Clarithromycin] Anaphylaxis    Sulfa Antibiotics Anaphylaxis    Cephalexin     Codeine     Lopid [Gemfibrozil]     Niaspan [Niacin]     Penicillins     Vancomycin      Turns \"me on fire\"  redness       Active Problems:    * No active hospital problems. *  Resolved Problems:    * No resolved hospital problems. *    Blood pressure 134/81, pulse 73, temperature 97.3 °F (36.3 °C), temperature source Temporal, resp. rate 18, height 1.778 m (5' 10\"), weight 83 kg (183 lb), SpO2 94 %.    Review of Systems  Complete review of systems was reviewed today and is unremarkable except for HPI.    Physical Exam  General: Awake, alert, no acute distress  HEENT: Head is normocephalic, atraumatic.  No gross visual or auditory acuity deficits.  Respiratory: Nonlabored  Cardiovascular: Regular rate  Left upper extremity: No open skin wounds or lesions.  Stable neurovascular exam.  Psychiatric: Calm and cooperative  Neurologic: Alert and oriented x 3    Assessment:  78-year-old gentleman with left middle finger trigger finger    Plan:  Left middle finger A1 pulley release    Leon Joy MD  2/8/2024

## 2024-02-08 NOTE — ANESTHESIA PRE PROCEDURE
lb)     Body mass index is 26.26 kg/m².    CBC:   Lab Results   Component Value Date/Time    WBC 5.0 01/30/2024 08:20 AM    RBC 5.14 01/30/2024 08:20 AM    HGB 15.9 01/30/2024 08:20 AM    HCT 47.2 01/30/2024 08:20 AM    MCV 91.8 01/30/2024 08:20 AM    RDW 11.7 01/30/2024 08:20 AM     01/30/2024 08:20 AM       CMP:   Lab Results   Component Value Date/Time     01/30/2024 08:20 AM    K 4.4 01/30/2024 08:20 AM     01/30/2024 08:20 AM    CO2 26 01/30/2024 08:20 AM    BUN 26 01/30/2024 08:20 AM    CREATININE 1.2 01/30/2024 08:20 AM    LABGLOM >60 01/30/2024 08:20 AM    GLUCOSE 112 01/30/2024 08:20 AM    CALCIUM 10.2 01/30/2024 08:20 AM       POC Tests: No results for input(s): \"POCGLU\", \"POCNA\", \"POCK\", \"POCCL\", \"POCBUN\", \"POCHEMO\", \"POCHCT\" in the last 72 hours.    Coags: No results found for: \"PROTIME\", \"INR\", \"APTT\"    HCG (If Applicable): No results found for: \"PREGTESTUR\", \"PREGSERUM\", \"HCG\", \"HCGQUANT\"     ABGs: No results found for: \"PHART\", \"PO2ART\", \"YVB4FRC\", \"BND8XAK\", \"BEART\", \"B6AVJKMT\"     Type & Screen (If Applicable):  No results found for: \"LABABO\", \"LABRH\"    Drug/Infectious Status (If Applicable):  No results found for: \"HIV\", \"HEPCAB\"    COVID-19 Screening (If Applicable):   Lab Results   Component Value Date/Time    COVID19 Not Detected 08/12/2021 09:40 AM           Anesthesia Evaluation  Patient summary reviewed   no history of anesthetic complications:   Airway: Mallampati: I  TM distance: >3 FB   Neck ROM: full  Mouth opening: > = 3 FB   Dental:          Pulmonary:normal exam  breath sounds clear to auscultation      (-) asthma, recent URI, sleep apnea and not a current smoker          Patient did not smoke on day of surgery.                 Cardiovascular:  Exercise tolerance: good (>4 METS)      (-) pacemaker, hypertension, past MI, CABG/stent and  angina    ECG reviewed  Rhythm: regular  Rate: normal           Beta Blocker:  Not on Beta Blocker         Neuro/Psych:      (-)

## 2024-02-08 NOTE — ANESTHESIA POSTPROCEDURE EVALUATION
Department of Anesthesiology  Postprocedure Note    Patient: Nicolas Alvarez Jr.  MRN: [id number]  YOB: 1945  Date of evaluation: 2/8/2024    Procedure Summary     Date: 02/08/24 Room / Location: [address]    Anesthesia Start: 0701 Anesthesia Stop: 0732    Procedure: LEFT MIDDLE FINGER TRIGGER FINGER RELEASE (Left) Diagnosis:       Trigger finger, left middle finger      (Trigger finger, left middle finger [M65.332])    Surgeons: Leon Joy MD Responsible Provider: Stephenie Garcia APRN - CRNA    Anesthesia Type: General ASA Status: 2          Anesthesia Type: General    Rosi Phase I: Rosi Score: 5    Rosi Phase II:      Anesthesia Post Evaluation    Patient location during evaluation: PACU  Patient participation: complete - patient participated  Level of consciousness: awake  Pain score: 0  Airway patency: patent  Nausea & Vomiting: no nausea and no vomiting  Cardiovascular status: blood pressure returned to baseline  Respiratory status: acceptable  Hydration status: euvolemic  Comments: /65   Pulse 92   Temp 97.9 °F (36.6 °C) (Temporal)   Resp 12   Ht 1.778 m (5' 10\")   Wt 83 kg (183 lb)   SpO2 97%   BMI 26.26 kg/m²     Pain management: adequate        No notable events documented.

## 2024-02-08 NOTE — OP NOTE
Patient Name: Salud  : 1945  MRN: [id number]    DATE of SURGERY: 2024    SURGEON: Leon Joy MD    ASSISTANT: None    PREOPERATIVE DIAGNOSES: Left middle finger trigger finger      POSTOPERATIVE DIAGNOSES: Left middle finger trigger finger     PROCEDURE PERFORMED: Left middle finger A1 pulley release (trigger release)       IMPLANTS  None.      ANESTHESIA    Monitored anesthesia care with local anesthesia.      OPERATIVE INDICATIONS    This patient is 78 y.o. male who presented to my clinic with complaints of triggering of the digit listed above.  The patient wished to proceed with surgery and understood the risks, benefits, and alternatives.  I did discuss the risk of damaging neurovascular bundles, recurrence, painful scar formation, infection, anesthetic complications.        ESTIMATED BLOOD LOSS    Less than 10 mL.      SPECIMENS  None.      DRAINS  None.      COMPLICATIONS    No intra-operative complications.      PROCEDURE IN DETAIL  The patient was seen in the preoperative holding room where the informed consent form was reviewed with the patient.  The site of surgery was marked with patient's agreement.  Patient was transported to the operating room, where a timeout was performed, identifying the correct patient, procedure and side.  Dose appropriate antibiotics were given as perioperative antibiotics. Monitored anesthesia care was then induced. A nonsterile tourniquet was placed about the operative brachium. The operative extremity was then prepped and draped in a normal sterile fashion.    A skin marker was used to delineate a longitudinal incision overlying the proposed location of the A1 pulley. Prior to inflation of the tourniquet, approximately 7 cc of 1% lidocaine with epinephrine were injected in line with the proposed incision. The operative extremity was then exsanguinated with an Esmarch and the tourniquet was inflated to 250 mmHg for less than 10 minutes. A 15 blade scalpel was

## 2024-03-07 ENCOUNTER — NURSE TRIAGE (OUTPATIENT)
Dept: CALL CENTER | Facility: HOSPITAL | Age: 79
End: 2024-03-07
Payer: MEDICARE

## 2024-03-08 NOTE — TELEPHONE ENCOUNTER
Daughter called for patient reporting higher than normal Blood glucose, dizziness, and general fatigue post illness 2-3 weeks ago. Was on Levaquin and Steroids. BG reading 150-160s, normally 108-110. Dizziness upon standing, and feeling weaker than normal, noticeable weight loss. Denies fever or other symptoms. BP, Heart rate normal. Advised daughter that steroids can increase BG. Advised that patient likely needs follow up with PCP to make sure nothing else is causing dizziness, fatigue, weight loss. Instructed will send message to PCP for call back. Advised if any changes in patient condition, to call [facility] back. Verbalizes understanding.       Reason for Disposition   [1] Caller has NON-URGENT question (includes prescribed medication questions) AND [2] triager unable to answer    Additional Information   Negative: Shock suspected (e.g., cold/pale/clammy skin, too weak to stand, low BP, rapid pulse)   Negative: Difficult to awaken or acting confused (e.g., disoriented, slurred speech)   Negative: Sounds like a life-threatening emergency to the triager   Negative: Recent (within last 24 hours) medical visit for an injury   Negative: Recent surgery or surgical procedure   Negative: Recent discharge from the hospital   Negative: Asthma attack diagnosed recently   Negative: Flu (influenza) diagnosed recently   Negative: Ear infection (otitis media, middle ear infection) diagnosed recently   Negative: Ear infection (otitis externa, swimmer's ear) diagnosed recently   Negative: [1] Sinus infection AND [2] taking an antibiotic   Negative: Skin infection (cellulitis) diagnosed recently   Negative: Strep throat (strep pharyngitis) diagnosed recently   Negative: Strep throat test result   Negative: Threatened miscarriage (threatened ) recently diagnosed   Negative: Urine infection (FEMALE; cystitis, pyelonephritis, urethritis) diagnosed recently   Negative: Urine infection (MALE; cystitis, pyelonephritis, prostatitis,  "epididymitis, orchitis, urethritis) diagnosed recently   Negative: Taking antibiotic for other infection   Negative: More than 24 hours since medical visit   Negative: [1] Drinking very little AND [2] dehydration suspected (e.g., no urine > 12 hours, very dry mouth, very lightheaded)   Negative: Patient sounds very sick or weak to the triager   Negative: Fever > 104 F (40 C)   Negative: [1] SEVERE pain (e.g., excruciating, pain scale 8-10) AND [2] not improved after pain medications   Negative: [1] Recent medical visit within 24 hours AND [2] condition / symptoms WORSE   Negative: [1] Recent medical visit within 24 hours AND [2] NEW symptom AND [3] that could be serious   Negative: [1] Caller has URGENT question (includes prescribed medication questions) AND [2] triager unable to answer question   Negative: [1] Recent medical visit within 24 hours AND [2] new-onset of fever AND [3] doctor (or NP/PA) said to call if this occurred    Answer Assessment - Initial Assessment Questions  1. MAIN CONCERN OR SYMPTOM:  \"What is your main concern right now?\" \"What question do you have?\" \"What's the main symptom you're worried about?\" (e.g., breathing difficulty, cough, fever. pain)     Higher than normal BG, dizziness  2. ONSET: \"When did the  illness  start?\"       Original illness started 3 weeks ago   3. BETTER-SAME-WORSE: \"Are you getting better, staying the same, or getting worse compared to how you felt at your last visit to the doctor (most recent medical visit)?\"      Somewhat better, but still fatigued   4. VISIT DATE: \"When were you seen?\" (Date)      3 weeks ago  5. VISIT DOCTOR: \"What is the name of the doctor taking care of you now?\"      Dr. Limon  6. VISIT DIAGNOSIS:  \"What was the main symptom or problem that you were seen for?\" \"Were you given a diagnosis?\"       Sore throat, fever - general ill feeling  7. VISIT MEDICINES: \"Did the doctor order any new medicines for you to use?\" If Yes, ask: \"Have you filled " "the prescription and started taking the medicine?\"       Levaquin, Steroids   8. NEXT APPOINTMENT: \"Have you scheduled a follow-up appointment with your doctor?\"      Not yet  9. PAIN: \"Is there any pain?\" If Yes, ask: \"How bad is it?\"  (Scale 0-10; or mild, moderate, severe)     - NONE (0): no pain     - MILD (1-3): doesn't interfere with normal activities      - MODERATE (4-7): interferes with normal activities or awakens from sleep      - SEVERE (8-10): excruciating pain, unable to do any normal activities      no  10. FEVER: \"Do you have a fever?\" If Yes, ask: \"What is it, how was it measured  and when did it start?\"        No Fever  11. OTHER SYMPTOMS: \"Do you have any other symptoms?\"        Dizziness upon standing, general weakness    Protocols used: Recent Medical Visit for Illness Follow-up Call-ADULT-[facility]    "

## 2024-04-01 ENCOUNTER — TELEPHONE (OUTPATIENT)
Dept: GASTROENTEROLOGY | Facility: CLINIC | Age: 79
End: 2024-04-01
Payer: MEDICARE

## 2024-04-01 NOTE — TELEPHONE ENCOUNTER
Patient called to say that he is not having any problems and even though he has an endo recall in our system, he does not want to have another endoscopy unless he is having problems.

## 2024-04-01 NOTE — TELEPHONE ENCOUNTER
SPOKE WITH PT'S SPOUSE ABOUT PT BEING DUE FOR A REPEAT ENDOSCOPY. HE IS OUT OF THE HOUSE AT THE TIME OF CALL. SHE TOOK THE OFFICE NUMBER AND WILL HAVE HIM CALL US TO MAKE AN OV.    LETTER SENT TO PCP.

## 2025-02-28 ENCOUNTER — OFFICE VISIT (OUTPATIENT)
Age: 80
End: 2025-02-28

## 2025-02-28 VITALS — WEIGHT: 182 LBS | HEIGHT: 70 IN | BODY MASS INDEX: 26.05 KG/M2

## 2025-02-28 DIAGNOSIS — M17.11 PRIMARY OSTEOARTHRITIS OF RIGHT KNEE: ICD-10-CM

## 2025-02-28 DIAGNOSIS — M25.561 RIGHT KNEE PAIN, UNSPECIFIED CHRONICITY: Primary | ICD-10-CM

## 2025-02-28 RX ORDER — LIDOCAINE HYDROCHLORIDE 10 MG/ML
2 INJECTION, SOLUTION INFILTRATION; PERINEURAL ONCE
Status: COMPLETED | OUTPATIENT
Start: 2025-02-28 | End: 2025-02-28

## 2025-02-28 RX ORDER — BETAMETHASONE SODIUM PHOSPHATE AND BETAMETHASONE ACETATE 3; 3 MG/ML; MG/ML
6 INJECTION, SUSPENSION INTRA-ARTICULAR; INTRALESIONAL; INTRAMUSCULAR; SOFT TISSUE ONCE
Status: COMPLETED | OUTPATIENT
Start: 2025-02-28 | End: 2025-02-28

## 2025-02-28 RX ADMIN — BETAMETHASONE SODIUM PHOSPHATE AND BETAMETHASONE ACETATE 6 MG: 3; 3 INJECTION, SUSPENSION INTRA-ARTICULAR; INTRALESIONAL; INTRAMUSCULAR; SOFT TISSUE at 16:11

## 2025-02-28 RX ADMIN — LIDOCAINE HYDROCHLORIDE 2 ML: 10 INJECTION, SOLUTION INFILTRATION; PERINEURAL at 16:11

## 2025-02-28 NOTE — PROGRESS NOTES
Orthopaedic History and Physical - New Patient    NAME:  Nicolas Alvarez Jr.   : 1945  MRN: [id number]      2025     CHIEF COMPLAINT:  had concerns including Knee Pain.     HISTORY OF PRESENT ILLNESS:   79-year-old male presents today with complaints of right medial sided knee pain.  The pain tends to wax and wane.  It does change with changes in barometric pressure.  He states he had significant pain last night.  Right now is not having much pain.  He has not had any particular injury.  He denies any swelling.  He states in the past he has had a corticosteroid injection but that was many years ago by Dr. Funes.    Past Medical History:        Diagnosis Date    Cancer (HCC)     prostate    GERD (gastroesophageal reflux disease)     Hyperlipidemia     Median arcuate ligament syndrome        Past Surgical History:        Procedure Laterality Date    FINGER TRIGGER RELEASE Left 2024    LEFT MIDDLE FINGER TRIGGER FINGER RELEASE performed by Leon Joy MD at [facility] OR    FOOT SURGERY      left foot procedure    HAND SURGERY      bilateral hand procedure    HAND SURGERY      bilateral thumb procedure    OTHER SURGICAL HISTORY      tempanoplasty    PROSTATECTOMY  2022    SPINE SURGERY      lumbar procedure x 3 for excision of benign tumor       Current Medications:   Prior to Admission medications    Medication Sig Start Date End Date Taking? Authorizing Provider   tadalafil (CIALIS) 5 MG tablet Take 1 tablet by mouth daily    Piedad Nunez MD   cyanocobalamin 1000 MCG/ML injection Inject 0.5 mLs into the muscle every 7 days    Piedad Nunez MD   Cholecalciferol (VITAMIN D-3 PO) Take 3 drops by mouth daily    Piedad Nunez MD   finasteride (PROSCAR) 5 MG tablet Take 0.5 tablets by mouth every other day  Patient not taking: Reported on 2025    Piedad Nunez MD   omeprazole (PRILOSEC) 20 MG delayed release capsule Take 1 capsule by mouth Daily    Provider

## 2025-04-03 DIAGNOSIS — M25.561 RIGHT KNEE PAIN, UNSPECIFIED CHRONICITY: Primary | ICD-10-CM

## 2025-04-03 DIAGNOSIS — M17.11 PRIMARY OSTEOARTHRITIS OF RIGHT KNEE: ICD-10-CM

## 2025-04-11 ENCOUNTER — HOSPITAL ENCOUNTER (OUTPATIENT)
Dept: MRI IMAGING | Age: 80
Discharge: HOME | End: 2025-04-11
Payer: MEDICARE

## 2025-04-11 DIAGNOSIS — M25.561 RIGHT KNEE PAIN, UNSPECIFIED CHRONICITY: ICD-10-CM

## 2025-04-11 DIAGNOSIS — M17.11 PRIMARY OSTEOARTHRITIS OF RIGHT KNEE: ICD-10-CM

## 2025-04-11 PROCEDURE — 73721 MRI JNT OF LWR EXTRE W/O DYE: CPT

## 2025-04-22 ENCOUNTER — OFFICE VISIT (OUTPATIENT)
Age: 80
End: 2025-04-22
Payer: MEDICARE

## 2025-04-22 VITALS — WEIGHT: 182 LBS | BODY MASS INDEX: 26.05 KG/M2 | HEIGHT: 70 IN

## 2025-04-22 DIAGNOSIS — S83.231D COMPLEX TEAR OF MEDIAL MENISCUS OF RIGHT KNEE AS CURRENT INJURY, SUBSEQUENT ENCOUNTER: Primary | ICD-10-CM

## 2025-04-22 PROCEDURE — 1159F MED LIST DOCD IN RCRD: CPT | Performed by: ORTHOPAEDIC SURGERY

## 2025-04-22 PROCEDURE — G8427 DOCREV CUR MEDS BY ELIG CLIN: HCPCS | Performed by: ORTHOPAEDIC SURGERY

## 2025-04-22 PROCEDURE — 1123F ACP DISCUSS/DSCN MKR DOCD: CPT | Performed by: ORTHOPAEDIC SURGERY

## 2025-04-22 PROCEDURE — 99214 OFFICE O/P EST MOD 30 MIN: CPT | Performed by: ORTHOPAEDIC SURGERY

## 2025-04-22 PROCEDURE — 1036F TOBACCO NON-USER: CPT | Performed by: ORTHOPAEDIC SURGERY

## 2025-04-22 PROCEDURE — G8419 CALC BMI OUT NRM PARAM NOF/U: HCPCS | Performed by: ORTHOPAEDIC SURGERY

## 2025-04-22 NOTE — PROGRESS NOTES
Orthopaedic Clinic Note - Established Patient    NAME:  Nicolas Alvarez Jr.   : 1945  MRN: [id number]      2025      CHIEF COMPLAINT: had concerns including Knee Pain (Right Knee MRI Review ).      History of Present Illness  The patient is an 80-year-old male who presents for evaluation of right knee pain. He is accompanied by his wife.    He reports experiencing severe pain in his right knee, which he describes as the most intense he has ever felt. The onset of this pain was sudden, occurring one morning without any precipitating event or injury. The pain is so severe that it disrupts his sleep and prevents him from finding a comfortable position. He experiences discomfort when standing, but the pain intensifies with bending, moving, or turning. Walking in a straight line also exacerbates the pain. The pain radiates down his leg and back up to his knee. He rates the pain as a 3 or 4 on a scale of 1 to 10. He is unable to squat due to the pain. He has received injections in his knee, which provided relief for approximately 1.5 days. He has been experiencing this pain for the past 2 months. He recalls a similar episode of knee pain 15 to 20 years ago, which was managed by Dr. Marquez with fluid aspiration, cortisone injection, and antibiotics, resulting in complete resolution of the symptoms.         Past Medical History:        Diagnosis Date    Cancer (HCC)     prostate    GERD (gastroesophageal reflux disease)     Hyperlipidemia     Median arcuate ligament syndrome        Past Surgical History:        Procedure Laterality Date    FINGER TRIGGER RELEASE Left 2024    LEFT MIDDLE FINGER TRIGGER FINGER RELEASE performed by Leon Joy MD at [facility] OR    FOOT SURGERY      left foot procedure    HAND SURGERY      bilateral hand procedure    HAND SURGERY      bilateral thumb procedure    OTHER SURGICAL HISTORY      tempanoplasty    PROSTATECTOMY  2022    SPINE SURGERY      lumbar procedure x 3 for

## 2025-04-28 DIAGNOSIS — S83.231D COMPLEX TEAR OF MEDIAL MENISCUS OF RIGHT KNEE AS CURRENT INJURY, SUBSEQUENT ENCOUNTER: Primary | ICD-10-CM

## 2025-04-28 RX ORDER — ONDANSETRON 4 MG/1
4 TABLET, FILM COATED ORAL EVERY 6 HOURS PRN
Qty: 28 TABLET | Refills: 0 | Status: SHIPPED | OUTPATIENT
Start: 2025-04-30

## 2025-04-28 RX ORDER — HYDROCODONE BITARTRATE AND ACETAMINOPHEN 10; 325 MG/1; MG/1
1 TABLET ORAL
Qty: 30 TABLET | Refills: 0 | Status: SHIPPED | OUTPATIENT
Start: 2025-04-30 | End: 2025-05-08

## 2025-05-16 ENCOUNTER — OFFICE VISIT (OUTPATIENT)
Age: 80
End: 2025-05-16

## 2025-05-16 DIAGNOSIS — S83.231D COMPLEX TEAR OF MEDIAL MENISCUS OF RIGHT KNEE AS CURRENT INJURY, SUBSEQUENT ENCOUNTER: ICD-10-CM

## 2025-05-16 DIAGNOSIS — M25.561 RIGHT KNEE PAIN, UNSPECIFIED CHRONICITY: Primary | ICD-10-CM

## 2025-05-16 PROCEDURE — 99024 POSTOP FOLLOW-UP VISIT: CPT | Performed by: PHYSICIAN ASSISTANT

## 2025-05-16 NOTE — PROGRESS NOTES
Orthopaedic Clinic Note - Established Patient    NAME:  Nicolas Alvarez Jr.   : 1945  MRN: [id number]      2025      CHIEF COMPLAINT: Status post partial medial meniscectomy      HISTORY OF PRESENT ILLNESS:   Is a pleasant 80-year-old comes in for 2-week follow-up after having a partial medial meniscectomy.  Patient states he is feeling better but still has some discomfort.  Denies any drainage from incision.  No fevers chills.    Past Medical History:        Diagnosis Date    Cancer (HCC)     prostate    GERD (gastroesophageal reflux disease)     Hyperlipidemia     Median arcuate ligament syndrome        Past Surgical History:        Procedure Laterality Date    FINGER TRIGGER RELEASE Left 2024    LEFT MIDDLE FINGER TRIGGER FINGER RELEASE performed by Leon Joy MD at [facility] OR    FOOT SURGERY      left foot procedure    HAND SURGERY      bilateral hand procedure    HAND SURGERY      bilateral thumb procedure    OTHER SURGICAL HISTORY      tempanoplasty    PROSTATECTOMY  2022    SPINE SURGERY      lumbar procedure x 3 for excision of benign tumor       Current Medications:   Prior to Admission medications    Medication Sig Start Date End Date Taking? Authorizing Provider   ondansetron (ZOFRAN) 4 MG tablet Take 1 tablet by mouth every 6 hours as needed for Nausea or Vomiting 25  Yes Low Crump MD   tadalafil (CIALIS) 5 MG tablet Take 1 tablet by mouth daily   Yes ProviderPiedad MD   cyanocobalamin 1000 MCG/ML injection Inject 0.5 mLs into the muscle every 7 days   Yes Piedad Nunez MD   Cholecalciferol (VITAMIN D-3 PO) Take 3 drops by mouth daily   Yes Piedad Nuenz MD   omeprazole (PRILOSEC) 20 MG delayed release capsule Take 1 capsule by mouth Daily   Yes Piedad Nunez MD   testosterone cypionate (DEPOTESTOTERONE CYPIONATE) 200 MG/ML injection Inject 0.7 mLs into the muscle once a week.   Yes Piedad Nunez MD   thyroid (ARMOUR) 60 MG tablet Take 1

## 2025-08-27 ENCOUNTER — HOSPITAL ENCOUNTER (OUTPATIENT)
Age: 80
Setting detail: SPECIMEN
Discharge: HOME | End: 2025-08-27
Payer: MEDICARE

## 2025-08-27 PROCEDURE — 88305 TISSUE EXAM BY PATHOLOGIST: CPT

## 2025-08-27 PROCEDURE — 88305 TISSUE EXAM BY PATHOLOGIST: CPT | Performed by: PATHOLOGY

## (undated) DEVICE — DRAPE,EXTREMITY,89X128,STERILE: Brand: MEDLINE

## (undated) DEVICE — MINOR CDS: Brand: MEDLINE INDUSTRIES, INC.

## (undated) DEVICE — OCCLUSIVE GAUZE STRIP,3% BISMUTH TRIBROMOPHENATE IN PETROLATUM BLEND: Brand: XEROFORM

## (undated) DEVICE — SENSR O2 OXIMAX FNGR A/ 18IN NONSTR

## (undated) DEVICE — BANDAGE COMPR W4INXL15FT BGE E SGL LAYERED CLP CLSR

## (undated) DEVICE — PADDING,UNDERCAST,COTTON, 4"X4YD STERILE: Brand: MEDLINE

## (undated) DEVICE — CONMED SCOPE SAVER BITE BLOCK, 20X27 MM: Brand: SCOPE SAVER

## (undated) DEVICE — SUTURE ETHLN SZ 4-0 L18IN NONABSORBABLE BLK L19MM PS-2 3/8 1667H

## (undated) DEVICE — TBG SMPL FLTR LINE NASL 02/C02 A/ BX/100

## (undated) DEVICE — DRAPE,U/ SHT,SPLIT,PLAS,STERIL: Brand: MEDLINE

## (undated) DEVICE — MSK O2 MD CONCENTR A/ LF 7FT 1P/U

## (undated) DEVICE — ZIMMER® STERILE DISPOSABLE TOURNIQUET CUFF WITH PLC, DUAL PORT, SINGLE BLADDER, 18 IN. (46 CM)

## (undated) DEVICE — BANDAGE COMPR W6INXL12FT SMOOTH FOR LIMB EXSANG ESMARCH

## (undated) DEVICE — CUFF,BP,DISP,1 TUBE,ADULT,HP: Brand: MEDLINE

## (undated) DEVICE — Device: Brand: DEFENDO AIR/WATER/SUCTION AND BIOPSY VALVE

## (undated) DEVICE — THE CHANNEL CLEANING BRUSH IS A NYLON FLEXI BRUSH ATTACHED TO A FLEXIBLE PLASTIC SHEATH DESIGNED TO SAFELY REMOVE DEBRIS FROM FLEXIBLE ENDOSCOPES.

## (undated) DEVICE — GLOVE SURG SZ 55 CRM LTX FREE POLYISOPRENE POLYMER BEAD ANTI

## (undated) DEVICE — FRCP BX RADJAW4 NDL 2.8 240 STD OG

## (undated) DEVICE — ENDOGATOR AUXILIARY WATER JET CONNECTOR: Brand: ENDOGATOR

## (undated) DEVICE — CORD BPLR L12FT DISP

## (undated) DEVICE — DISPOSABLE BIPOLAR FORCEPS 5" (12.7CM) ADSON, INSULATED 1MM TIP AND 12FT. (3.6M) CABLE: Brand: ADVANCED MED-SURG CONCEPTS

## (undated) DEVICE — YANKAUER,BULB TIP WITH VENT: Brand: ARGYLE

## (undated) DEVICE — GLOVE SURG SZ 75 CRM LTX FREE POLYISOPRENE POLYMER BEAD ANTI